# Patient Record
Sex: FEMALE | Race: ASIAN | NOT HISPANIC OR LATINO | URBAN - METROPOLITAN AREA
[De-identification: names, ages, dates, MRNs, and addresses within clinical notes are randomized per-mention and may not be internally consistent; named-entity substitution may affect disease eponyms.]

---

## 2018-05-01 ENCOUNTER — OUTPATIENT (OUTPATIENT)
Dept: OUTPATIENT SERVICES | Facility: HOSPITAL | Age: 28
LOS: 1 days | End: 2018-05-01
Payer: MEDICAID

## 2018-05-01 PROCEDURE — G9001: CPT

## 2018-05-07 ENCOUNTER — EMERGENCY (EMERGENCY)
Facility: HOSPITAL | Age: 28
LOS: 0 days | Discharge: LEFT BEFORE DISCHARGE | End: 2018-05-08
Attending: EMERGENCY MEDICINE
Payer: MEDICAID

## 2018-05-07 VITALS
SYSTOLIC BLOOD PRESSURE: 96 MMHG | TEMPERATURE: 100 F | OXYGEN SATURATION: 100 % | WEIGHT: 119.93 LBS | RESPIRATION RATE: 18 BRPM | HEIGHT: 64 IN | DIASTOLIC BLOOD PRESSURE: 54 MMHG | HEART RATE: 98 BPM

## 2018-05-07 DIAGNOSIS — A41.9 SEPSIS, UNSPECIFIED ORGANISM: ICD-10-CM

## 2018-05-07 DIAGNOSIS — R05 COUGH: ICD-10-CM

## 2018-05-07 DIAGNOSIS — M54.9 DORSALGIA, UNSPECIFIED: ICD-10-CM

## 2018-05-07 DIAGNOSIS — O99.89 OTHER SPECIFIED DISEASES AND CONDITIONS COMPLICATING PREGNANCY, CHILDBIRTH AND THE PUERPERIUM: ICD-10-CM

## 2018-05-07 DIAGNOSIS — R50.9 FEVER, UNSPECIFIED: ICD-10-CM

## 2018-05-07 LAB
APTT BLD: 32.2 SEC — SIGNIFICANT CHANGE UP (ref 27.5–37.4)
BASOPHILS # BLD AUTO: 0.03 K/UL — SIGNIFICANT CHANGE UP (ref 0–0.2)
BASOPHILS NFR BLD AUTO: 0.2 % — SIGNIFICANT CHANGE UP (ref 0–2)
EOSINOPHIL # BLD AUTO: 0.24 K/UL — SIGNIFICANT CHANGE UP (ref 0–0.5)
EOSINOPHIL NFR BLD AUTO: 1.8 % — SIGNIFICANT CHANGE UP (ref 0–6)
HCT VFR BLD CALC: 38.5 % — SIGNIFICANT CHANGE UP (ref 34.5–45)
HGB BLD-MCNC: 12.5 G/DL — SIGNIFICANT CHANGE UP (ref 11.5–15.5)
IMM GRANULOCYTES NFR BLD AUTO: 0.2 % — SIGNIFICANT CHANGE UP (ref 0–1.5)
INR BLD: 1.12 RATIO — SIGNIFICANT CHANGE UP (ref 0.88–1.16)
LYMPHOCYTES # BLD AUTO: 1.18 K/UL — SIGNIFICANT CHANGE UP (ref 1–3.3)
LYMPHOCYTES # BLD AUTO: 8.8 % — LOW (ref 13–44)
MCHC RBC-ENTMCNC: 24.7 PG — LOW (ref 27–34)
MCHC RBC-ENTMCNC: 32.5 GM/DL — SIGNIFICANT CHANGE UP (ref 32–36)
MCV RBC AUTO: 76.1 FL — LOW (ref 80–100)
MONOCYTES # BLD AUTO: 0.93 K/UL — HIGH (ref 0–0.9)
MONOCYTES NFR BLD AUTO: 6.9 % — SIGNIFICANT CHANGE UP (ref 2–14)
NEUTROPHILS # BLD AUTO: 11.02 K/UL — HIGH (ref 1.8–7.4)
NEUTROPHILS NFR BLD AUTO: 82.1 % — HIGH (ref 43–77)
NRBC # BLD: 0 /100 WBCS — SIGNIFICANT CHANGE UP (ref 0–0)
PLATELET # BLD AUTO: 270 K/UL — SIGNIFICANT CHANGE UP (ref 150–400)
PROTHROM AB SERPL-ACNC: 12.2 SEC — SIGNIFICANT CHANGE UP (ref 9.8–12.7)
RBC # BLD: 5.06 M/UL — SIGNIFICANT CHANGE UP (ref 3.8–5.2)
RBC # FLD: 14.3 % — SIGNIFICANT CHANGE UP (ref 10.3–14.5)
WBC # BLD: 13.43 K/UL — HIGH (ref 3.8–10.5)
WBC # FLD AUTO: 13.43 K/UL — HIGH (ref 3.8–10.5)

## 2018-05-07 PROCEDURE — 99284 EMERGENCY DEPT VISIT MOD MDM: CPT | Mod: 25

## 2018-05-08 VITALS — SYSTOLIC BLOOD PRESSURE: 105 MMHG | DIASTOLIC BLOOD PRESSURE: 56 MMHG | RESPIRATION RATE: 20 BRPM | HEART RATE: 95 BPM

## 2018-05-08 DIAGNOSIS — R69 ILLNESS, UNSPECIFIED: ICD-10-CM

## 2018-05-08 LAB
ALBUMIN SERPL ELPH-MCNC: 3.8 G/DL — SIGNIFICANT CHANGE UP (ref 3.3–5)
ALP SERPL-CCNC: 90 U/L — SIGNIFICANT CHANGE UP (ref 40–120)
ALT FLD-CCNC: 28 U/L — SIGNIFICANT CHANGE UP (ref 12–78)
ANION GAP SERPL CALC-SCNC: 12 MMOL/L — SIGNIFICANT CHANGE UP (ref 5–17)
APPEARANCE UR: CLEAR — SIGNIFICANT CHANGE UP
AST SERPL-CCNC: 17 U/L — SIGNIFICANT CHANGE UP (ref 15–37)
BILIRUB SERPL-MCNC: 0.3 MG/DL — SIGNIFICANT CHANGE UP (ref 0.2–1.2)
BILIRUB UR-MCNC: NEGATIVE — SIGNIFICANT CHANGE UP
BLD GP AB SCN SERPL QL: SIGNIFICANT CHANGE UP
BUN SERPL-MCNC: 10 MG/DL — SIGNIFICANT CHANGE UP (ref 7–23)
CALCIUM SERPL-MCNC: 10.3 MG/DL — HIGH (ref 8.5–10.1)
CHLORIDE SERPL-SCNC: 106 MMOL/L — SIGNIFICANT CHANGE UP (ref 96–108)
CO2 SERPL-SCNC: 22 MMOL/L — SIGNIFICANT CHANGE UP (ref 22–31)
COLOR SPEC: YELLOW — SIGNIFICANT CHANGE UP
CREAT SERPL-MCNC: 0.75 MG/DL — SIGNIFICANT CHANGE UP (ref 0.5–1.3)
DIFF PNL FLD: NEGATIVE — SIGNIFICANT CHANGE UP
GLUCOSE SERPL-MCNC: 78 MG/DL — SIGNIFICANT CHANGE UP (ref 70–99)
GLUCOSE UR QL: NEGATIVE MG/DL — SIGNIFICANT CHANGE UP
HCG SERPL-ACNC: SIGNIFICANT CHANGE UP MIU/ML
KETONES UR-MCNC: ABNORMAL
LACTATE SERPL-SCNC: 1.1 MMOL/L — SIGNIFICANT CHANGE UP (ref 0.7–2)
LEUKOCYTE ESTERASE UR-ACNC: NEGATIVE — SIGNIFICANT CHANGE UP
NITRITE UR-MCNC: NEGATIVE — SIGNIFICANT CHANGE UP
PH UR: 7 — SIGNIFICANT CHANGE UP (ref 5–8)
POTASSIUM SERPL-MCNC: 3.6 MMOL/L — SIGNIFICANT CHANGE UP (ref 3.5–5.3)
POTASSIUM SERPL-SCNC: 3.6 MMOL/L — SIGNIFICANT CHANGE UP (ref 3.5–5.3)
PROT SERPL-MCNC: 9 GM/DL — HIGH (ref 6–8.3)
PROT UR-MCNC: NEGATIVE MG/DL — SIGNIFICANT CHANGE UP
SODIUM SERPL-SCNC: 140 MMOL/L — SIGNIFICANT CHANGE UP (ref 135–145)
SP GR SPEC: 1.01 — SIGNIFICANT CHANGE UP (ref 1.01–1.02)
UROBILINOGEN FLD QL: NEGATIVE MG/DL — SIGNIFICANT CHANGE UP

## 2018-05-08 RX ORDER — ACETAMINOPHEN 500 MG
650 TABLET ORAL ONCE
Qty: 0 | Refills: 0 | Status: COMPLETED | OUTPATIENT
Start: 2018-05-08 | End: 2018-05-08

## 2018-05-08 RX ORDER — CEFTRIAXONE 500 MG/1
2 INJECTION, POWDER, FOR SOLUTION INTRAMUSCULAR; INTRAVENOUS ONCE
Qty: 0 | Refills: 0 | Status: COMPLETED | OUTPATIENT
Start: 2018-05-08 | End: 2018-05-08

## 2018-05-08 RX ORDER — SODIUM CHLORIDE 9 MG/ML
1000 INJECTION INTRAMUSCULAR; INTRAVENOUS; SUBCUTANEOUS ONCE
Qty: 0 | Refills: 0 | Status: COMPLETED | OUTPATIENT
Start: 2018-05-08 | End: 2018-05-08

## 2018-05-08 RX ORDER — METOCLOPRAMIDE HCL 10 MG
10 TABLET ORAL ONCE
Qty: 0 | Refills: 0 | Status: COMPLETED | OUTPATIENT
Start: 2018-05-08 | End: 2018-05-08

## 2018-05-08 RX ORDER — SODIUM CHLORIDE 9 MG/ML
2000 INJECTION INTRAMUSCULAR; INTRAVENOUS; SUBCUTANEOUS ONCE
Qty: 0 | Refills: 0 | Status: COMPLETED | OUTPATIENT
Start: 2018-05-08 | End: 2018-05-08

## 2018-05-08 RX ADMIN — Medication 650 MILLIGRAM(S): at 01:27

## 2018-05-08 RX ADMIN — SODIUM CHLORIDE 2000 MILLILITER(S): 9 INJECTION INTRAMUSCULAR; INTRAVENOUS; SUBCUTANEOUS at 01:26

## 2018-05-08 RX ADMIN — SODIUM CHLORIDE 2000 MILLILITER(S): 9 INJECTION INTRAMUSCULAR; INTRAVENOUS; SUBCUTANEOUS at 00:21

## 2018-05-08 RX ADMIN — Medication 10 MILLIGRAM(S): at 00:20

## 2018-05-08 RX ADMIN — CEFTRIAXONE 100 GRAM(S): 500 INJECTION, POWDER, FOR SOLUTION INTRAMUSCULAR; INTRAVENOUS at 01:30

## 2018-05-08 NOTE — ED PROVIDER NOTE - OBJECTIVE STATEMENT
Pertinent PMH/PSH/FHx/SHx and Review of Systems contained within:  27 yo 14 week gestation f presents in ED c/o fever, nasal congestion, cough, back pain and abdominal pain. No aggravating or relieving factors, No chills, No photophobia/eye pain/changes in vision, No ear pain/sore throat/dysphagia, No chest pain/palpitations, nowheeze/stridor, No N/V/D, no dysuria/frequency/discharge, No neck/back pain, no rash, no changes in neurological status/function.

## 2018-05-08 NOTE — ED PROVIDER NOTE - MEDICAL DECISION MAKING DETAILS
labs reviewed. Patient spoken through [facility] several times (ID [id number], [id number])regarding condition and risks and benefits of imaging and admission to hospital. Patient refusing all imaging including US and refusing to stay.  Last . The patient has decided to leave against medical advice.  The patient is AAOx3, not intoxicated, and displays normal decision making ability. We discussed all risks, benefits, and alternatives to the progression of treatment and the potential dangers of leaving including but not limited to permanent disability, injury, and death.  The patient was instructed that they are welcome to change their decision to leave against medical advice and return to the emergency department at any time and for any reason in order to allow us to render care.

## 2018-05-08 NOTE — ED ADULT NURSE REASSESSMENT NOTE - NS ED NURSE REASSESS COMMENT FT1
Cyracom used BY Md to communicate with pt,  Pt remains very selective with treatment, continues to cough intermittently then vomiting x1.  at bedside
Patient is alert and oriented to person, place and time of day. Discharged home, vss, ambulatory .Discharge  instructions given to pt/

## 2018-05-08 NOTE — ED ADULT NURSE NOTE - OBJECTIVE STATEMENT
Patient complaining of abdominal pain, nausea and vomiting for the last 2 days Patient complaining of abdominal pain,fever,  nausea and vomiting for the last 2 days, pt is 12 weeks pregnant

## 2018-05-09 LAB
CULTURE RESULTS: NO GROWTH — SIGNIFICANT CHANGE UP
SPECIMEN SOURCE: SIGNIFICANT CHANGE UP

## 2018-05-13 LAB
CULTURE RESULTS: SIGNIFICANT CHANGE UP
CULTURE RESULTS: SIGNIFICANT CHANGE UP
SPECIMEN SOURCE: SIGNIFICANT CHANGE UP
SPECIMEN SOURCE: SIGNIFICANT CHANGE UP

## 2018-10-19 ENCOUNTER — EMERGENCY (EMERGENCY)
Facility: HOSPITAL | Age: 28
LOS: 1 days | Discharge: STILL ADMITTED OR ADMINISTRATIVE | End: 2018-10-19
Admitting: EMERGENCY MEDICINE

## 2018-10-19 ENCOUNTER — INPATIENT (INPATIENT)
Facility: HOSPITAL | Age: 28
LOS: 2 days | Discharge: HOME | End: 2018-10-22
Attending: SPECIALIST | Admitting: SPECIALIST

## 2018-10-19 VITALS
SYSTOLIC BLOOD PRESSURE: 130 MMHG | HEART RATE: 82 BPM | DIASTOLIC BLOOD PRESSURE: 92 MMHG | RESPIRATION RATE: 24 BRPM | OXYGEN SATURATION: 100 %

## 2018-10-19 VITALS — HEART RATE: 83 BPM | DIASTOLIC BLOOD PRESSURE: 60 MMHG | SYSTOLIC BLOOD PRESSURE: 135 MMHG | TEMPERATURE: 98 F

## 2018-10-19 DIAGNOSIS — Z3A.00 WEEKS OF GESTATION OF PREGNANCY NOT SPECIFIED: ICD-10-CM

## 2018-10-19 DIAGNOSIS — O26.899 OTHER SPECIFIED PREGNANCY RELATED CONDITIONS, UNSPECIFIED TRIMESTER: ICD-10-CM

## 2018-10-19 LAB
BASOPHILS # BLD AUTO: 0.03 K/UL — SIGNIFICANT CHANGE UP (ref 0–0.2)
BASOPHILS NFR BLD AUTO: 0.2 % — SIGNIFICANT CHANGE UP (ref 0–2)
BLD GP AB SCN SERPL QL: NEGATIVE — SIGNIFICANT CHANGE UP
EOSINOPHIL # BLD AUTO: 0.1 K/UL — SIGNIFICANT CHANGE UP (ref 0–0.5)
EOSINOPHIL NFR BLD AUTO: 0.7 % — SIGNIFICANT CHANGE UP (ref 0–6)
HBV SURFACE AG SER-ACNC: NEGATIVE — SIGNIFICANT CHANGE UP
HCT VFR BLD CALC: 36.5 % — SIGNIFICANT CHANGE UP (ref 34.5–45)
HGB BLD-MCNC: 11.7 G/DL — SIGNIFICANT CHANGE UP (ref 11.5–15.5)
IMM GRANULOCYTES # BLD AUTO: 0.13 # — SIGNIFICANT CHANGE UP
IMM GRANULOCYTES NFR BLD AUTO: 0.9 % — SIGNIFICANT CHANGE UP (ref 0–1.5)
LYMPHOCYTES # BLD AUTO: 1.48 K/UL — SIGNIFICANT CHANGE UP (ref 1–3.3)
LYMPHOCYTES # BLD AUTO: 10.5 % — LOW (ref 13–44)
MCHC RBC-ENTMCNC: 24.9 PG — LOW (ref 27–34)
MCHC RBC-ENTMCNC: 32.1 % — SIGNIFICANT CHANGE UP (ref 32–36)
MCV RBC AUTO: 77.8 FL — LOW (ref 80–100)
MONOCYTES # BLD AUTO: 1 K/UL — HIGH (ref 0–0.9)
MONOCYTES NFR BLD AUTO: 7.1 % — SIGNIFICANT CHANGE UP (ref 2–14)
NEUTROPHILS # BLD AUTO: 11.41 K/UL — HIGH (ref 1.8–7.4)
NEUTROPHILS NFR BLD AUTO: 80.6 % — HIGH (ref 43–77)
NRBC # FLD: 0 — SIGNIFICANT CHANGE UP
PLATELET # BLD AUTO: 218 K/UL — SIGNIFICANT CHANGE UP (ref 150–400)
PMV BLD: 11.4 FL — SIGNIFICANT CHANGE UP (ref 7–13)
RBC # BLD: 4.69 M/UL — SIGNIFICANT CHANGE UP (ref 3.8–5.2)
RBC # FLD: 14.4 % — SIGNIFICANT CHANGE UP (ref 10.3–14.5)
RH IG SCN BLD-IMP: POSITIVE — SIGNIFICANT CHANGE UP
RH IG SCN BLD-IMP: POSITIVE — SIGNIFICANT CHANGE UP
WBC # BLD: 14.15 K/UL — HIGH (ref 3.8–10.5)
WBC # FLD AUTO: 14.15 K/UL — HIGH (ref 3.8–10.5)

## 2018-10-19 RX ORDER — IBUPROFEN 200 MG
600 TABLET ORAL EVERY 6 HOURS
Qty: 0 | Refills: 0 | Status: COMPLETED | OUTPATIENT
Start: 2018-10-19 | End: 2019-09-17

## 2018-10-19 RX ORDER — SODIUM CHLORIDE 9 MG/ML
3 INJECTION INTRAMUSCULAR; INTRAVENOUS; SUBCUTANEOUS EVERY 8 HOURS
Qty: 0 | Refills: 0 | Status: DISCONTINUED | OUTPATIENT
Start: 2018-10-19 | End: 2018-10-19

## 2018-10-19 RX ORDER — IBUPROFEN 200 MG
600 TABLET ORAL EVERY 6 HOURS
Qty: 0 | Refills: 0 | Status: DISCONTINUED | OUTPATIENT
Start: 2018-10-19 | End: 2018-10-22

## 2018-10-19 RX ORDER — SODIUM CHLORIDE 9 MG/ML
1000 INJECTION, SOLUTION INTRAVENOUS
Qty: 0 | Refills: 0 | Status: DISCONTINUED | OUTPATIENT
Start: 2018-10-19 | End: 2018-10-19

## 2018-10-19 RX ORDER — HYDROCORTISONE 1 %
1 OINTMENT (GRAM) TOPICAL EVERY 4 HOURS
Qty: 0 | Refills: 0 | Status: DISCONTINUED | OUTPATIENT
Start: 2018-10-19 | End: 2018-10-22

## 2018-10-19 RX ORDER — ACETAMINOPHEN 500 MG
975 TABLET ORAL EVERY 6 HOURS
Qty: 0 | Refills: 0 | Status: COMPLETED | OUTPATIENT
Start: 2018-10-19 | End: 2019-09-17

## 2018-10-19 RX ORDER — SODIUM CHLORIDE 9 MG/ML
3 INJECTION INTRAMUSCULAR; INTRAVENOUS; SUBCUTANEOUS EVERY 8 HOURS
Qty: 0 | Refills: 0 | Status: DISCONTINUED | OUTPATIENT
Start: 2018-10-19 | End: 2018-10-21

## 2018-10-19 RX ORDER — OXYTOCIN 10 UNIT/ML
41.67 VIAL (ML) INJECTION
Qty: 20 | Refills: 0 | Status: DISCONTINUED | OUTPATIENT
Start: 2018-10-19 | End: 2018-10-19

## 2018-10-19 RX ORDER — DIPHENHYDRAMINE HCL 50 MG
25 CAPSULE ORAL EVERY 6 HOURS
Qty: 0 | Refills: 0 | Status: DISCONTINUED | OUTPATIENT
Start: 2018-10-19 | End: 2018-10-22

## 2018-10-19 RX ORDER — GLYCERIN ADULT
1 SUPPOSITORY, RECTAL RECTAL AT BEDTIME
Qty: 0 | Refills: 0 | Status: DISCONTINUED | OUTPATIENT
Start: 2018-10-19 | End: 2018-10-22

## 2018-10-19 RX ORDER — LANOLIN
1 OINTMENT (GRAM) TOPICAL EVERY 6 HOURS
Qty: 0 | Refills: 0 | Status: DISCONTINUED | OUTPATIENT
Start: 2018-10-19 | End: 2018-10-22

## 2018-10-19 RX ORDER — SIMETHICONE 80 MG/1
80 TABLET, CHEWABLE ORAL EVERY 6 HOURS
Qty: 0 | Refills: 0 | Status: DISCONTINUED | OUTPATIENT
Start: 2018-10-19 | End: 2018-10-22

## 2018-10-19 RX ORDER — MAGNESIUM HYDROXIDE 400 MG/1
30 TABLET, CHEWABLE ORAL
Qty: 0 | Refills: 0 | Status: DISCONTINUED | OUTPATIENT
Start: 2018-10-19 | End: 2018-10-22

## 2018-10-19 RX ORDER — HYDROCORTISONE 1 %
1 OINTMENT (GRAM) TOPICAL EVERY 4 HOURS
Qty: 0 | Refills: 0 | Status: DISCONTINUED | OUTPATIENT
Start: 2018-10-19 | End: 2018-10-19

## 2018-10-19 RX ORDER — PRAMOXINE HYDROCHLORIDE 150 MG/15G
1 AEROSOL, FOAM RECTAL EVERY 4 HOURS
Qty: 0 | Refills: 0 | Status: DISCONTINUED | OUTPATIENT
Start: 2018-10-19 | End: 2018-10-22

## 2018-10-19 RX ORDER — INFLUENZA VIRUS VACCINE 15; 15; 15; 15 UG/.5ML; UG/.5ML; UG/.5ML; UG/.5ML
0.5 SUSPENSION INTRAMUSCULAR ONCE
Qty: 0 | Refills: 0 | Status: COMPLETED | OUTPATIENT
Start: 2018-10-19 | End: 2018-10-20

## 2018-10-19 RX ORDER — DIBUCAINE 1 %
1 OINTMENT (GRAM) RECTAL EVERY 4 HOURS
Qty: 0 | Refills: 0 | Status: DISCONTINUED | OUTPATIENT
Start: 2018-10-19 | End: 2018-10-19

## 2018-10-19 RX ORDER — AER TRAVELER 0.5 G/1
1 SOLUTION RECTAL; TOPICAL EVERY 4 HOURS
Qty: 0 | Refills: 0 | Status: DISCONTINUED | OUTPATIENT
Start: 2018-10-19 | End: 2018-10-19

## 2018-10-19 RX ORDER — OXYCODONE HYDROCHLORIDE 5 MG/1
5 TABLET ORAL
Qty: 0 | Refills: 0 | Status: DISCONTINUED | OUTPATIENT
Start: 2018-10-19 | End: 2018-10-22

## 2018-10-19 RX ORDER — AER TRAVELER 0.5 G/1
1 SOLUTION RECTAL; TOPICAL EVERY 4 HOURS
Qty: 0 | Refills: 0 | Status: DISCONTINUED | OUTPATIENT
Start: 2018-10-19 | End: 2018-10-22

## 2018-10-19 RX ORDER — OXYTOCIN 10 UNIT/ML
41.67 VIAL (ML) INJECTION
Qty: 20 | Refills: 0 | Status: DISCONTINUED | OUTPATIENT
Start: 2018-10-19 | End: 2018-10-22

## 2018-10-19 RX ORDER — PRAMOXINE HYDROCHLORIDE 150 MG/15G
1 AEROSOL, FOAM RECTAL EVERY 4 HOURS
Qty: 0 | Refills: 0 | Status: DISCONTINUED | OUTPATIENT
Start: 2018-10-19 | End: 2018-10-19

## 2018-10-19 RX ORDER — ACETAMINOPHEN 500 MG
975 TABLET ORAL EVERY 6 HOURS
Qty: 0 | Refills: 0 | Status: DISCONTINUED | OUTPATIENT
Start: 2018-10-19 | End: 2018-10-22

## 2018-10-19 RX ORDER — OXYCODONE HYDROCHLORIDE 5 MG/1
5 TABLET ORAL EVERY 4 HOURS
Qty: 0 | Refills: 0 | Status: DISCONTINUED | OUTPATIENT
Start: 2018-10-19 | End: 2018-10-22

## 2018-10-19 RX ORDER — OXYTOCIN 10 UNIT/ML
333.33 VIAL (ML) INJECTION
Qty: 20 | Refills: 0 | Status: DISCONTINUED | OUTPATIENT
Start: 2018-10-19 | End: 2018-10-19

## 2018-10-19 RX ORDER — TETANUS TOXOID, REDUCED DIPHTHERIA TOXOID AND ACELLULAR PERTUSSIS VACCINE, ADSORBED 5; 2.5; 8; 8; 2.5 [IU]/.5ML; [IU]/.5ML; UG/.5ML; UG/.5ML; UG/.5ML
0.5 SUSPENSION INTRAMUSCULAR ONCE
Qty: 0 | Refills: 0 | Status: DISCONTINUED | OUTPATIENT
Start: 2018-10-19 | End: 2018-10-22

## 2018-10-19 RX ORDER — KETOROLAC TROMETHAMINE 30 MG/ML
30 SYRINGE (ML) INJECTION ONCE
Qty: 0 | Refills: 0 | Status: DISCONTINUED | OUTPATIENT
Start: 2018-10-19 | End: 2018-10-19

## 2018-10-19 RX ORDER — DOCUSATE SODIUM 100 MG
100 CAPSULE ORAL
Qty: 0 | Refills: 0 | Status: DISCONTINUED | OUTPATIENT
Start: 2018-10-19 | End: 2018-10-22

## 2018-10-19 RX ORDER — DIBUCAINE 1 %
1 OINTMENT (GRAM) RECTAL EVERY 4 HOURS
Qty: 0 | Refills: 0 | Status: DISCONTINUED | OUTPATIENT
Start: 2018-10-19 | End: 2018-10-22

## 2018-10-19 RX ADMIN — Medication 30 MILLIGRAM(S): at 14:02

## 2018-10-19 RX ADMIN — Medication 600 MILLIGRAM(S): at 21:50

## 2018-10-19 RX ADMIN — Medication 975 MILLIGRAM(S): at 21:50

## 2018-10-19 RX ADMIN — OXYCODONE HYDROCHLORIDE 5 MILLIGRAM(S): 5 TABLET ORAL at 14:07

## 2018-10-19 RX ADMIN — Medication 975 MILLIGRAM(S): at 22:30

## 2018-10-19 RX ADMIN — Medication 600 MILLIGRAM(S): at 22:30

## 2018-10-19 RX ADMIN — Medication 30 MILLIGRAM(S): at 13:25

## 2018-10-19 RX ADMIN — Medication 125 MILLIUNIT(S)/MIN: at 13:01

## 2018-10-19 RX ADMIN — Medication 1000 MILLIUNIT(S)/MIN: at 12:35

## 2018-10-19 NOTE — PATIENT PROFILE OB - ALERT: PERTINENT HISTORY
1st Trimester Sonogram/20 Week Level II Sonogram/BioPhysical Profile(s)/Non Invasive Prenatal Screen (NIPS)/Follow up Sonogram for Growth

## 2018-10-19 NOTE — PATIENT PROFILE OB - PRO ANTIBODY SCREEN
History of Present Illness  [facility] Clinical Progress Note [facility]:   Specialized Services Documentation - Therapist must complete separate progress note for each specific clinical activity in which the client participated during the day  (875) Group Psychotherapy: (9:30-10:30) Neean participated in psychotherapy group focused on self-esteem, communication and mental health stigma  Neena identified her memory and concentration as a stressor  Neena discussed her pattern of needing validation from other people constantly and stated that she wants to work on being able to provide herself with the validation she needs  She stated that the last time she was in program, she feels she did not work hard enough outside of the program day, but this time she wants to take it more seriously and work on herself outside of the program because she wants to feel happier with herself  Peers provided good support and suggestions for ways to focus on changing the way she thinks and affirms herself  Moderate progress toward goals noted today  Continue psychotherapy group to encourage Neena to explore stressors and coping, as well as to relate to peers on similar issues  Treatment Plan Problem(s): 1 1, 1 2  Carolyn Walden MSW, LSW     (284) Group Psychotherapy: [id number] Jenny Kaufman participated in wellness group focused on healthy ways to manage daily stress and to prevent decompensation  Neena shared that some types of stress are actually "good stress" as it prepares you to act and in preparing such as making a list of goals for the next day anticipating a stressful day that it will decrease stress and help her sleep better  Jenny Kaufman made moderate beginning progress toward goal  Continue group to provide education and practice on healthy coping mechanisms to manage daily stress  Treatment Plan Problem(s): 1 1,1 2  Roland Waters RN       (902) Education Therapy Goals set - take daughter shopping     Treatment Plan Problem(s): 1 1  Education Therapy Time - 0900 - 0930 Previous goal was met  Readiness to Learning:  She is receptive to learning  There are  no barriers to learning  Learning Assessment Time - 1330 - 1400   Education completed on  illness, medication and wellness tools  The teaching method was  verbal and written  Shared area of learning: Yes  FAYE Castro     (166) Allied Therapy [id number] Neena actively shared in [facility] group focused on self-care  Group engaged in task exploring what individuals do for self-care and barriers to accomplishing this  She shared that self-care for her has struggled due to time and her belief that [facility] else comes firstâ  She identified a way to commit to her self-care tonight is to âeat dinnerâ  Some beginning effort toward goal observed  Continue AT to explore self-awareness and personal role in self-care  Treatment Plan Problem(s): 1 1  FAYE Castro       Case Management Note:   2564-7653 Met with Neena  Reviewed treatment plan  She was tearful but in control reporting that she "texted" Fabien Singh last night in response to him texting her Tuesday night that she have a good day  Encouraged her to think about her ability to text other positive people in her life (like her children)  She has plans tonight to school shop with her daughter which she knows she needs to budget  Neena reported her Gabapentin dose is correct in the system (300mg TID)  TREATMENT SESSION NUMBER: 2   Current suicide risk is low  Medications not changed/added/denied  FAYE Castro      Active Problems    1  ADHD, predominantly inattentive type (314 00) (F90 0)   2  Allergic rhinitis (477 9) (J30 9)   3  Anxiety and depression (300 00,311) (F41 9,F32 9)   4  Benign essential hypertension (401 1) (I10)   5  BMI 31 0-31 9,adult (V85 31) (Z68 31)   6  Cervicalgia (723 1) (M54 2)   7  Constipation (564 00) (K59 00)   8   Dysfunction of eustachian tube, unspecified laterality (381 81) (H69 80)   9  Encounter for screening mammogram for malignant neoplasm of breast (V76 12)   (Z12 31)   10  FORTINO (generalized anxiety disorder) (300 02) (F41 1)   11  Genital herpes simplex (054 10) (A60 00)   12  Headache (784 0) (R51)   13  Hip pain, unspecified laterality   14  Hypokalemia (276 8) (E87 6)   15  Lumbago (724 2) (M54 5)   16  Lyme disease (088 81) (A69 20)   17  Motion sickness (994 6) (T75 3XXA)   18  Obesity, Class I, BMI 30-34 9 (278 00) (E66 9)   19  Other muscle spasm (728 85) (M62 838)   20  Severe episode of recurrent major depressive disorder, without psychotic features    (296 33) (F33 2)   21  Tachycardia (785 0) (R00 0)   22  Thyroid disorder (246 9) (E07 9)   23  Well adult on routine health check (V70 0) (Z00 00)    Past Medical History    1  History of Acute bronchitis (466 0) (J20 9)   2  History of Acute pharyngitis (462) (J02 9)   3  History of Acute sinusitis (461 9) (J01 90)   4  History of Acute sinusitis (461 9) (J01 90)   5  History of Acute tonsillitis (463) (J03 90)   6  History of Acute upper respiratory infection (465 9) (J06 9)   7  History of Acute upper respiratory infection (465 9) (J06 9)   8  History of Acute UTI (599 0) (N39 0)   9  History of Cervicalgia (723 1) (M54 2)   10  Denied: History of Head trauma   11  History of acute pharyngitis (V12 69) (Z87 09)   12  History of migraine (V12 49) (Z86 69)   13  History of Nonallopathic lesion (739 9) (M99 9)   14  History of Palpitations (785 1) (R00 2)   15  History of Sebaceous Gland Disorder (706 9)   16  Denied: History of Seizure   17  History of Shoulder joint pain, unspecified laterality   18  Urinary tract infection (599 0) (N39 0)    Allergies    1  Percocet TABS    Current Meds   1  Albuterol 90 MCG/ACT AERS; INHALE 1 TO 2 PUFFS EVERY 4 TO 6 HOURS AS   NEEDED; Therapy: (Recorded:38Cwe4620) to Recorded   2   BuPROPion HCl ER (XL) 300 MG Oral Tablet Extended Release 24 Hour; TAKE 1   TABLET Daily dhaval tablets; Therapy: 99LHJ8514 to (Evaluate:23Jun2016)  Requested for: 39DAK8004; Last   Rx:69Myf8043 Ordered   3  CloNIDine HCl - 0 1 MG Oral Tablet; Take one tablet PO  AT 6:00 PM and 1 tablet PO AT   9:00 pm;   Therapy: (Recorded:01Ume2406) to Recorded   4  Griselda 0 35 MG Oral Tablet; TAKE 1 TABLET DAILY; Therapy: 15APB0568 to Recorded   5  Flonase 50 MCG/ACT SUSP; USE 1 SPRAY IN EACH NOSTRIL ONCE DAILY; Therapy: (Recorded:27Xsg2104) to Recorded   6  Gabapentin 300 MG Oral Capsule; TAKE 3 CAPSULE Bedtime; Therapy: (Recorded:00Xgf8974) to Recorded   7  HydrOXYzine HCl - 25 MG Oral Tablet; TAKE 1 TO 2 TABLETS AT BEDTIME; Last   Rx:24May2016 Ordered   8  Metoprolol Succinate ER 50 MG Oral Tablet Extended Release 24 Hour; TAKE 1 TABLET   DAILY; Therapy: 54RMB6902 to (Evaluate:16Oct2016)  Requested for: 41BPD4303; Last   Rx:19Apr2016 Ordered   9  Multivitamins TABS; 1 Every Day; Therapy: 01MVR2820 to  Requested for: 93YZI7255 Recorded   10  Pristiq 100 MG Oral Tablet Extended Release 24 Hour; Take 1 tablet daily; Therapy: 49ATV4884 to  Requested for: 50LHU6082 Recorded   11  Vyvanse 50 MG Oral Capsule; 1 capsule at 2:00pm;    Therapy: 62UHT2322 to  Requested for: 95PPB7180 Recorded   12  Vyvanse 60 MG Oral Capsule; take 1 capsule daily; Therapy: (Recorded:24May2016) to Recorded   13  Zaleplon 10 MG Oral Capsule; TAKE 1 TO 2 CAPSULES AT BEDTIME AS NEEDED; Last    JY:93XRR4935 Ordered    Family Psych History  Son    1  Family history of attention deficit hyperactivity disorder (ADHD) (V17 0) (Z81 8)  Sister    2  Family history of Bipolar 1 disorder  Family History    3  Family history of Asbestosis   4  Family history of Cancer   5  Family history of Diabetes Mellitus (V18 0)   6  Family history of Emphysema   7  Family history of Glaucoma   8  Family history of Heart Disease (V17 49)   9  Family history of Hypertension (V17 49)   10   Family history of Mixed Hyperlipoproteinemia    Social History    · College graduate   ·    · Employed   · Never A Smoker   · No history of alcohol use    Future Appointments    Date/Time Provider Specialty Site   09/02/2016 10:00 AM GEORGETTE Wang   [facility] PARTIAL HOSPITALIZATION   09/20/2016 11:00 AM Paulette Hankins [facility]     Signatures   Electronically signed by : JONATHAN You; Sep  1 2016 12:53PM EST                       (Author)    Electronically signed by : FAYE Jones; Sep  1 2016  2:38PM EST                       (Author)    Electronically signed by : Luis Carlos Jaime RN; Sep  1 2016  3:44PM EST                       (Author) positive

## 2018-10-20 LAB — T PALLIDUM AB TITR SER: NEGATIVE — SIGNIFICANT CHANGE UP

## 2018-10-20 RX ORDER — IBUPROFEN 200 MG
1 TABLET ORAL
Qty: 0 | Refills: 0 | DISCHARGE
Start: 2018-10-20

## 2018-10-20 RX ORDER — IBUPROFEN 200 MG
1 TABLET ORAL
Qty: 0 | Refills: 0 | COMMUNITY
Start: 2018-10-20

## 2018-10-20 RX ADMIN — Medication 600 MILLIGRAM(S): at 17:52

## 2018-10-20 RX ADMIN — SODIUM CHLORIDE 3 MILLILITER(S): 9 INJECTION INTRAMUSCULAR; INTRAVENOUS; SUBCUTANEOUS at 14:57

## 2018-10-20 RX ADMIN — Medication 975 MILLIGRAM(S): at 18:52

## 2018-10-20 RX ADMIN — Medication 975 MILLIGRAM(S): at 17:52

## 2018-10-20 RX ADMIN — Medication 975 MILLIGRAM(S): at 03:55

## 2018-10-20 RX ADMIN — INFLUENZA VIRUS VACCINE 0.5 MILLILITER(S): 15; 15; 15; 15 SUSPENSION INTRAMUSCULAR at 22:59

## 2018-10-20 RX ADMIN — OXYCODONE HYDROCHLORIDE 5 MILLIGRAM(S): 5 TABLET ORAL at 02:14

## 2018-10-20 RX ADMIN — Medication 100 MILLIGRAM(S): at 02:14

## 2018-10-20 RX ADMIN — Medication 600 MILLIGRAM(S): at 12:07

## 2018-10-20 RX ADMIN — Medication 975 MILLIGRAM(S): at 04:30

## 2018-10-20 RX ADMIN — Medication 600 MILLIGRAM(S): at 03:55

## 2018-10-20 RX ADMIN — Medication 600 MILLIGRAM(S): at 04:30

## 2018-10-20 RX ADMIN — Medication 1 TABLET(S): at 12:07

## 2018-10-20 RX ADMIN — Medication 975 MILLIGRAM(S): at 13:00

## 2018-10-20 RX ADMIN — Medication 975 MILLIGRAM(S): at 12:08

## 2018-10-20 RX ADMIN — Medication 600 MILLIGRAM(S): at 13:00

## 2018-10-20 RX ADMIN — Medication 600 MILLIGRAM(S): at 18:50

## 2018-10-20 RX ADMIN — OXYCODONE HYDROCHLORIDE 5 MILLIGRAM(S): 5 TABLET ORAL at 03:00

## 2018-10-20 NOTE — DISCHARGE NOTE OB - AVOID SEXUAL ACTIVITY UNTIL YOUR POSTPARTUM VISIT
Patient passed SBT and extubated in am doing well  Passed swallow and started on diet  Complains of pain off fentanyl drip will add hydrocodone? Allergy noted but patient received diludid when she came without any problem      CIRA Quiñonez [address].  Danielle Leon [address] [address]  Phone [phone]   Fax [phone]  Pulmonary Critical Care & Sleep Medicine Statement Selected

## 2018-10-20 NOTE — DISCHARGE NOTE OB - CARE PLAN
Principal Discharge DX:	Vaginal delivery  Goal:	routine postpartum care  Assessment and plan of treatment:	follow up in 4 weeks/ regular diet  & activity as tolerate

## 2018-10-20 NOTE — DISCHARGE NOTE OB - MATERIALS PROVIDED
[facility] Screening Program/Breastfeeding Log/[facility] Hearing Screen Program/Guide to Postpartum Care [facility]  Screening Program/[place]  Immunization Record/Breastfeeding Log/Shaken Baby Prevention Handout/Birth Certificate Instructions/Breastfeeding Guide and Packet/MMR Vaccination (VIS Pub Date: 2012)/[facility] Hearing Screen Program/Discharge Medication Information for Patients and Families Pocket Guide/Tdap Vaccination (VIS Pub Date: 2012)/Breastfeeding Mother’s Support Group Information/Guide to Postpartum Care

## 2018-10-20 NOTE — DISCHARGE NOTE OB - CARE PROVIDER_API CALL
Romel Vela), Obstetrics and Gynecology  [address] [place] [address]  Phone: [phone]  Fax: [phone]

## 2018-10-20 NOTE — DISCHARGE NOTE OB - MEDICATION SUMMARY - MEDICATIONS TO TAKE
I will START or STAY ON the medications listed below when I get home from the hospital:    ibuprofen 600 mg oral tablet  -- 1 tab(s) by mouth every 6 hours  -- Indication: For for pain I will START or STAY ON the medications listed below when I get home from the hospital:    ibuprofen 600 mg oral tablet  -- 1 tab(s) by mouth every 6 hours, As Needed  -- Indication: For moderate pain

## 2018-10-20 NOTE — DISCHARGE NOTE OB - PATIENT PORTAL LINK FT
You can access the [facility] Patient Portal, offered by [facility], by registering with the following website: [email or web address]

## 2018-10-21 RX ADMIN — Medication 975 MILLIGRAM(S): at 01:20

## 2018-10-21 RX ADMIN — Medication 975 MILLIGRAM(S): at 14:30

## 2018-10-21 RX ADMIN — Medication 600 MILLIGRAM(S): at 23:42

## 2018-10-21 RX ADMIN — Medication 975 MILLIGRAM(S): at 23:42

## 2018-10-21 RX ADMIN — Medication 600 MILLIGRAM(S): at 13:58

## 2018-10-21 RX ADMIN — Medication 975 MILLIGRAM(S): at 06:54

## 2018-10-21 RX ADMIN — Medication 600 MILLIGRAM(S): at 00:40

## 2018-10-21 RX ADMIN — Medication 975 MILLIGRAM(S): at 13:58

## 2018-10-21 RX ADMIN — Medication 600 MILLIGRAM(S): at 01:20

## 2018-10-21 RX ADMIN — Medication 600 MILLIGRAM(S): at 07:24

## 2018-10-21 RX ADMIN — Medication 1 TABLET(S): at 13:58

## 2018-10-21 RX ADMIN — Medication 975 MILLIGRAM(S): at 07:24

## 2018-10-21 RX ADMIN — Medication 600 MILLIGRAM(S): at 06:54

## 2018-10-21 RX ADMIN — Medication 600 MILLIGRAM(S): at 14:30

## 2018-10-21 RX ADMIN — Medication 975 MILLIGRAM(S): at 00:40

## 2018-10-21 NOTE — PROVIDER CONTACT NOTE (OTHER) - ACTION/TREATMENT ORDERED:
Since Dr Vela not in the hospital she stated she would ask an OB resident to cancel pt d/c order for today. RYLAN Becker RN notified of social situation.

## 2018-10-21 NOTE — PROVIDER CONTACT NOTE (OTHER) - SITUATION
Notified Dr Vela regarding social work/ ACS evaluation prior to 's d/c home. ACS unable to clear infant today, and will continue the investigation tomorrow 10/22/18.

## 2018-10-22 VITALS
SYSTOLIC BLOOD PRESSURE: 108 MMHG | OXYGEN SATURATION: 100 % | RESPIRATION RATE: 18 BRPM | HEART RATE: 80 BPM | TEMPERATURE: 98 F | DIASTOLIC BLOOD PRESSURE: 69 MMHG

## 2018-10-22 RX ADMIN — Medication 975 MILLIGRAM(S): at 06:47

## 2018-10-22 RX ADMIN — Medication 975 MILLIGRAM(S): at 00:15

## 2018-10-22 RX ADMIN — Medication 975 MILLIGRAM(S): at 17:05

## 2018-10-22 RX ADMIN — Medication 600 MILLIGRAM(S): at 06:30

## 2018-10-22 RX ADMIN — Medication 975 MILLIGRAM(S): at 06:30

## 2018-10-22 RX ADMIN — Medication 975 MILLIGRAM(S): at 12:13

## 2018-10-22 RX ADMIN — Medication 600 MILLIGRAM(S): at 00:15

## 2018-10-22 RX ADMIN — Medication 1 TABLET(S): at 12:13

## 2018-10-22 RX ADMIN — Medication 600 MILLIGRAM(S): at 17:05

## 2018-10-22 RX ADMIN — Medication 600 MILLIGRAM(S): at 12:13

## 2018-10-22 RX ADMIN — Medication 600 MILLIGRAM(S): at 06:47

## 2018-10-22 RX ADMIN — Medication 25 MILLIGRAM(S): at 01:00

## 2019-08-18 ENCOUNTER — INPATIENT (INPATIENT)
Facility: HOSPITAL | Age: 29
LOS: 1 days | Discharge: HOME | End: 2019-08-20
Attending: SPECIALIST | Admitting: SPECIALIST

## 2019-08-18 ENCOUNTER — EMERGENCY (EMERGENCY)
Facility: HOSPITAL | Age: 29
LOS: 1 days | Discharge: STILL ADMITTED OR ADMINISTRATIVE | End: 2019-08-18
Admitting: EMERGENCY MEDICINE

## 2019-08-18 VITALS
DIASTOLIC BLOOD PRESSURE: 71 MMHG | HEART RATE: 99 BPM | OXYGEN SATURATION: 99 % | SYSTOLIC BLOOD PRESSURE: 119 MMHG | HEIGHT: 63 IN | TEMPERATURE: 98 F | RESPIRATION RATE: 20 BRPM | WEIGHT: 169.76 LBS

## 2019-08-18 DIAGNOSIS — O26.899 OTHER SPECIFIED PREGNANCY RELATED CONDITIONS, UNSPECIFIED TRIMESTER: ICD-10-CM

## 2019-08-18 DIAGNOSIS — Z3A.00 WEEKS OF GESTATION OF PREGNANCY NOT SPECIFIED: ICD-10-CM

## 2019-08-18 LAB
BASOPHILS # BLD AUTO: 0.04 K/UL — SIGNIFICANT CHANGE UP (ref 0–0.2)
BASOPHILS NFR BLD AUTO: 0.4 % — SIGNIFICANT CHANGE UP (ref 0–2)
BLD GP AB SCN SERPL QL: NEGATIVE — SIGNIFICANT CHANGE UP
EOSINOPHIL # BLD AUTO: 0.15 K/UL — SIGNIFICANT CHANGE UP (ref 0–0.5)
EOSINOPHIL NFR BLD AUTO: 1.4 % — SIGNIFICANT CHANGE UP (ref 0–6)
HCT VFR BLD CALC: 39.5 % — SIGNIFICANT CHANGE UP (ref 34.5–45)
HGB BLD-MCNC: 12.6 G/DL — SIGNIFICANT CHANGE UP (ref 11.5–15.5)
IMM GRANULOCYTES NFR BLD AUTO: 0.6 % — SIGNIFICANT CHANGE UP (ref 0–1.5)
LYMPHOCYTES # BLD AUTO: 2.59 K/UL — SIGNIFICANT CHANGE UP (ref 1–3.3)
LYMPHOCYTES # BLD AUTO: 24.7 % — SIGNIFICANT CHANGE UP (ref 13–44)
MCHC RBC-ENTMCNC: 24.2 PG — LOW (ref 27–34)
MCHC RBC-ENTMCNC: 31.9 % — LOW (ref 32–36)
MCV RBC AUTO: 76 FL — LOW (ref 80–100)
MONOCYTES # BLD AUTO: 1.09 K/UL — HIGH (ref 0–0.9)
MONOCYTES NFR BLD AUTO: 10.4 % — SIGNIFICANT CHANGE UP (ref 2–14)
NEUTROPHILS # BLD AUTO: 6.57 K/UL — SIGNIFICANT CHANGE UP (ref 1.8–7.4)
NEUTROPHILS NFR BLD AUTO: 62.5 % — SIGNIFICANT CHANGE UP (ref 43–77)
NRBC # FLD: 0 K/UL — SIGNIFICANT CHANGE UP (ref 0–0)
PLATELET # BLD AUTO: 263 K/UL — SIGNIFICANT CHANGE UP (ref 150–400)
PMV BLD: 11.5 FL — SIGNIFICANT CHANGE UP (ref 7–13)
RBC # BLD: 5.2 M/UL — SIGNIFICANT CHANGE UP (ref 3.8–5.2)
RBC # FLD: 14.3 % — SIGNIFICANT CHANGE UP (ref 10.3–14.5)
RH IG SCN BLD-IMP: POSITIVE — SIGNIFICANT CHANGE UP
WBC # BLD: 10.5 K/UL — SIGNIFICANT CHANGE UP (ref 3.8–10.5)
WBC # FLD AUTO: 10.5 K/UL — SIGNIFICANT CHANGE UP (ref 3.8–10.5)

## 2019-08-18 RX ORDER — OXYCODONE HYDROCHLORIDE 5 MG/1
5 TABLET ORAL
Refills: 0 | Status: DISCONTINUED | OUTPATIENT
Start: 2019-08-18 | End: 2019-08-20

## 2019-08-18 RX ORDER — DIPHENHYDRAMINE HCL 50 MG
25 CAPSULE ORAL EVERY 6 HOURS
Refills: 0 | Status: DISCONTINUED | OUTPATIENT
Start: 2019-08-18 | End: 2019-08-20

## 2019-08-18 RX ORDER — KETOROLAC TROMETHAMINE 30 MG/ML
30 SYRINGE (ML) INJECTION ONCE
Refills: 0 | Status: DISCONTINUED | OUTPATIENT
Start: 2019-08-18 | End: 2019-08-18

## 2019-08-18 RX ORDER — SODIUM CHLORIDE 9 MG/ML
3 INJECTION INTRAMUSCULAR; INTRAVENOUS; SUBCUTANEOUS EVERY 8 HOURS
Refills: 0 | Status: DISCONTINUED | OUTPATIENT
Start: 2019-08-18 | End: 2019-08-20

## 2019-08-18 RX ORDER — AER TRAVELER 0.5 G/1
1 SOLUTION RECTAL; TOPICAL EVERY 4 HOURS
Refills: 0 | Status: DISCONTINUED | OUTPATIENT
Start: 2019-08-18 | End: 2019-08-20

## 2019-08-18 RX ORDER — LANOLIN
1 OINTMENT (GRAM) TOPICAL EVERY 6 HOURS
Refills: 0 | Status: DISCONTINUED | OUTPATIENT
Start: 2019-08-18 | End: 2019-08-20

## 2019-08-18 RX ORDER — AMPICILLIN TRIHYDRATE 250 MG
1 CAPSULE ORAL EVERY 4 HOURS
Refills: 0 | Status: DISCONTINUED | OUTPATIENT
Start: 2019-08-18 | End: 2019-08-18

## 2019-08-18 RX ORDER — OXYCODONE HYDROCHLORIDE 5 MG/1
5 TABLET ORAL ONCE
Refills: 0 | Status: DISCONTINUED | OUTPATIENT
Start: 2019-08-18 | End: 2019-08-20

## 2019-08-18 RX ORDER — AMPICILLIN TRIHYDRATE 250 MG
2 CAPSULE ORAL ONCE
Refills: 0 | Status: COMPLETED | OUTPATIENT
Start: 2019-08-18 | End: 2019-08-18

## 2019-08-18 RX ORDER — GLYCERIN ADULT
1 SUPPOSITORY, RECTAL RECTAL AT BEDTIME
Refills: 0 | Status: DISCONTINUED | OUTPATIENT
Start: 2019-08-18 | End: 2019-08-20

## 2019-08-18 RX ORDER — DOCUSATE SODIUM 100 MG
100 CAPSULE ORAL
Refills: 0 | Status: DISCONTINUED | OUTPATIENT
Start: 2019-08-18 | End: 2019-08-20

## 2019-08-18 RX ORDER — DIBUCAINE 1 %
1 OINTMENT (GRAM) RECTAL EVERY 6 HOURS
Refills: 0 | Status: DISCONTINUED | OUTPATIENT
Start: 2019-08-18 | End: 2019-08-20

## 2019-08-18 RX ORDER — PRAMOXINE HYDROCHLORIDE 150 MG/15G
1 AEROSOL, FOAM RECTAL EVERY 4 HOURS
Refills: 0 | Status: DISCONTINUED | OUTPATIENT
Start: 2019-08-18 | End: 2019-08-20

## 2019-08-18 RX ORDER — SIMETHICONE 80 MG/1
80 TABLET, CHEWABLE ORAL EVERY 4 HOURS
Refills: 0 | Status: DISCONTINUED | OUTPATIENT
Start: 2019-08-18 | End: 2019-08-20

## 2019-08-18 RX ORDER — MAGNESIUM HYDROXIDE 400 MG/1
30 TABLET, CHEWABLE ORAL
Refills: 0 | Status: DISCONTINUED | OUTPATIENT
Start: 2019-08-18 | End: 2019-08-20

## 2019-08-18 RX ORDER — HYDROCORTISONE 1 %
1 OINTMENT (GRAM) TOPICAL EVERY 6 HOURS
Refills: 0 | Status: DISCONTINUED | OUTPATIENT
Start: 2019-08-18 | End: 2019-08-20

## 2019-08-18 RX ORDER — IBUPROFEN 200 MG
600 TABLET ORAL EVERY 6 HOURS
Refills: 0 | Status: COMPLETED | OUTPATIENT
Start: 2019-08-18 | End: 2020-07-16

## 2019-08-18 RX ORDER — OXYTOCIN 10 UNIT/ML
333.33 VIAL (ML) INJECTION
Qty: 20 | Refills: 0 | Status: DISCONTINUED | OUTPATIENT
Start: 2019-08-18 | End: 2019-08-18

## 2019-08-18 RX ORDER — BENZOCAINE 10 %
1 GEL (GRAM) MUCOUS MEMBRANE EVERY 6 HOURS
Refills: 0 | Status: DISCONTINUED | OUTPATIENT
Start: 2019-08-18 | End: 2019-08-20

## 2019-08-18 RX ORDER — TETANUS TOXOID, REDUCED DIPHTHERIA TOXOID AND ACELLULAR PERTUSSIS VACCINE, ADSORBED 5; 2.5; 8; 8; 2.5 [IU]/.5ML; [IU]/.5ML; UG/.5ML; UG/.5ML; UG/.5ML
0.5 SUSPENSION INTRAMUSCULAR ONCE
Refills: 0 | Status: DISCONTINUED | OUTPATIENT
Start: 2019-08-18 | End: 2019-08-20

## 2019-08-18 RX ORDER — SODIUM CHLORIDE 9 MG/ML
1000 INJECTION, SOLUTION INTRAVENOUS
Refills: 0 | Status: DISCONTINUED | OUTPATIENT
Start: 2019-08-18 | End: 2019-08-18

## 2019-08-18 RX ORDER — ACETAMINOPHEN 500 MG
975 TABLET ORAL
Refills: 0 | Status: DISCONTINUED | OUTPATIENT
Start: 2019-08-18 | End: 2019-08-20

## 2019-08-18 RX ADMIN — Medication 1000 MILLIUNIT(S)/MIN: at 13:18

## 2019-08-18 RX ADMIN — Medication 975 MILLIGRAM(S): at 23:04

## 2019-08-18 RX ADMIN — Medication 25 MILLIGRAM(S): at 15:58

## 2019-08-18 RX ADMIN — SODIUM CHLORIDE 125 MILLILITER(S): 9 INJECTION, SOLUTION INTRAVENOUS at 11:59

## 2019-08-18 RX ADMIN — Medication 975 MILLIGRAM(S): at 15:58

## 2019-08-18 RX ADMIN — Medication 975 MILLIGRAM(S): at 23:50

## 2019-08-18 RX ADMIN — Medication 30 MILLIGRAM(S): at 13:28

## 2019-08-18 RX ADMIN — Medication 30 MILLIGRAM(S): at 13:46

## 2019-08-18 RX ADMIN — Medication 216 GRAM(S): at 11:50

## 2019-08-18 NOTE — DISCHARGE NOTE OB - MEDICATION SUMMARY - MEDICATIONS TO STOP TAKING
I will STOP taking the medications listed below when I get home from the hospital:    ibuprofen 600 mg oral tablet  -- 1 tab(s) by mouth every 6 hours, As Needed    chela

## 2019-08-18 NOTE — DISCHARGE NOTE OB - CARE PROVIDER_API CALL
Romel Vela)  Obstetrics and Gynecology  [address] [place] [address], [address]  [place] [address]  Phone: [phone]  Fax: [phone]  Follow Up Time:

## 2019-08-18 NOTE — ED ADULT TRIAGE NOTE - CHIEF COMPLAINT QUOTE
Pt is   EDC  BIB EMS water broke and she is in labor unable to obtain v/s pt not staying still   L & D called pt transferred with EMS and ED tech

## 2019-08-18 NOTE — OB RN DELIVERY SUMMARY - NSDELAYEDCLAMPA_OBGYN_ALL_OB
Family history of hypertension     Mother  Still living? Unknown  Family history of uterine fibroid, Age at diagnosis: Age Unknown
Yes

## 2019-08-18 NOTE — DISCHARGE NOTE OB - CARE PLAN
Principal Discharge DX:	Vaginal delivery  Goal:	Routine postpartum care  Assessment and plan of treatment:	follow up in 4 weeks/ regular diet & activity as tolerate  Secondary Diagnosis:	 delivery

## 2019-08-18 NOTE — DISCHARGE NOTE OB - PATIENT PORTAL LINK FT
You can access the [facility] Patient Portal, offered by [facility], by registering with the following website: [email or web address]

## 2019-08-18 NOTE — OB PROVIDER H&P - HISTORY OF PRESENT ILLNESS
30yo  @ 36.6 presents with c/o frequent contractions and spotting. Denies LOF and reports GFM.     Denies PMH/PSH    OB H/O 2010 FT   10/15/2016 FT   10/19/2018 FT   Patient does not know weights of previous deliveries    AP course uncomplicated  GBS positive

## 2019-08-18 NOTE — OB PROVIDER TRIAGE NOTE - NSHPPHYSICALEXAM_GEN_ALL_CORE
Assessment reveals VSS, abdomen soft, NT, gravid.   VE 7/100/-2  EFW 6.0 by palp    Patient desires epidural

## 2019-08-18 NOTE — OB PROVIDER TRIAGE NOTE - NS_OBGYNHISTORY_OBGYN_ALL_OB_FT
OB H/O 2010 FT   10/15/2016 FT   10/19/2018 FT   Patient does not know weights of previous deliveries

## 2019-08-18 NOTE — DISCHARGE NOTE OB - MATERIALS PROVIDED
Vaccinations/[facility] Hearing Screen Program/  Immunization Record/Breastfeeding Log/[facility]  Screening Program/Shaken Baby Prevention Handout/Birth Certificate Instructions/Breastfeeding Mother’s Support Group Information/Guide to Postpartum Care

## 2019-08-18 NOTE — CHART NOTE - NSCHARTNOTEFT_GEN_A_CORE
Patient seen at 1725 due to reports of pruritis of lower legs bilaterally and striae to abdomen. Patient reports the pruritis started at the end of pregnancy and states she was informed by Dr. Vela that pruritis is ok, no rash noted.     Assessment:  28y/o  0day @ 1259 . No medical/surgical history.   Irritation to skin along striae noted.   No rash noted to lower extremities by lower leg/ankle area, but healing areas of broken/scared skin and healing pustules due to pruritis.     Plan:  Spoke to Dr. Vela who stated that patient was negative for cholestasis in pregnancy and no need for dermatology consult as this time due to skin irritation for pruritis healing.   If pustules to develop and/or pruritis worsen will refer to dermatologist out patient.   Benadryl can be given for relief if needed.

## 2019-08-18 NOTE — OB RN TRIAGE NOTE - NS_TRIAGEADDITIONAL COMMENTS_OBGYN_ALL_OB_FT
rec'd pt via EMS.Pt very uncomfotable,requesting epidural. VE 7/100/-2 by CIARAN Bojorquez. Charge nurse Xiomara,RNC notified. Pt transferred to LDR7. Justin,CINDYC

## 2019-08-18 NOTE — OB PROVIDER TRIAGE NOTE - HISTORY OF PRESENT ILLNESS
28yo  @ 36.6 presents with c/o frequent contractions and spotting. Denies LOF and reports GFM.     Denies PMH/PSH    OB H/O 2010 FT   10/15/2016 FT   10/19/2018 FT   Patient does not know weights of previous deliveries    AP course uncomplicated  GBS positive

## 2019-08-18 NOTE — OB NEONATOLOGY/PEDIATRICIAN DELIVERY SUMMARY - NSPEDSNEONOTESA_OBGYN_ALL_OB_FT
Baby is a 36.6 week GA female born to a 29 y/o  mother via . Maternal history uncomplicated. Pregnancy uncomplicated. Maternal blood type ___. Prenatal labs N/N/NR/I. GBS positive on . SROM at 12:55 (<18hrs) with clear fluid. Baby born vigorous and crying spontaneously. Delayed cord clamping for 30 seconds. Warmed, dried, stimulated, suctioned. Apgars 9/9. Mom consents to hep B, plans to bottle feed. EOS 0.07.    Baby stable for transfer to  nursery. Parents updated. Baby is a 36.6 week GA female born to a 29 y/o  mother via . Maternal history uncomplicated. Pregnancy uncomplicated. Maternal blood type A+. Prenatal labs N/N/NR/I. GBS positive on . SROM at 12:55 (<18hrs) with clear fluid. Baby born vigorous and crying spontaneously. Delayed cord clamping for 30 seconds. Warmed, dried, stimulated, suctioned. Apgars 9/9. Mom consents to hep B, plans to bottle feed. EOS 0.07.    Baby stable for transfer to  nursery. Parents updated.

## 2019-08-19 LAB — T PALLIDUM AB TITR SER: NEGATIVE — SIGNIFICANT CHANGE UP

## 2019-08-19 RX ORDER — IBUPROFEN 200 MG
600 TABLET ORAL EVERY 6 HOURS
Refills: 0 | Status: DISCONTINUED | OUTPATIENT
Start: 2019-08-19 | End: 2019-08-20

## 2019-08-19 RX ADMIN — SODIUM CHLORIDE 3 MILLILITER(S): 9 INJECTION INTRAMUSCULAR; INTRAVENOUS; SUBCUTANEOUS at 07:57

## 2019-08-19 RX ADMIN — Medication 600 MILLIGRAM(S): at 02:16

## 2019-08-19 RX ADMIN — Medication 975 MILLIGRAM(S): at 16:39

## 2019-08-19 RX ADMIN — OXYCODONE HYDROCHLORIDE 5 MILLIGRAM(S): 5 TABLET ORAL at 04:50

## 2019-08-19 RX ADMIN — Medication 975 MILLIGRAM(S): at 04:52

## 2019-08-19 RX ADMIN — Medication 975 MILLIGRAM(S): at 05:35

## 2019-08-19 RX ADMIN — SODIUM CHLORIDE 3 MILLILITER(S): 9 INJECTION INTRAMUSCULAR; INTRAVENOUS; SUBCUTANEOUS at 04:01

## 2019-08-19 RX ADMIN — Medication 600 MILLIGRAM(S): at 02:50

## 2019-08-19 RX ADMIN — Medication 600 MILLIGRAM(S): at 13:38

## 2019-08-19 RX ADMIN — OXYCODONE HYDROCHLORIDE 5 MILLIGRAM(S): 5 TABLET ORAL at 05:30

## 2019-08-19 RX ADMIN — Medication 600 MILLIGRAM(S): at 12:38

## 2019-08-19 RX ADMIN — Medication 975 MILLIGRAM(S): at 21:15

## 2019-08-19 RX ADMIN — Medication 1 TABLET(S): at 12:38

## 2019-08-19 RX ADMIN — Medication 975 MILLIGRAM(S): at 15:39

## 2019-08-19 RX ADMIN — Medication 975 MILLIGRAM(S): at 20:36

## 2019-08-19 NOTE — PROGRESS NOTE ADULT - SUBJECTIVE AND OBJECTIVE BOX
Anesthesia Post-op Note    POD#1 S/P epidural    Patient is doing well.  OOBAA. Tolerating clears.  Pain is tolerable.  No residual anesthetic issues or complications noted.

## 2019-08-20 VITALS
OXYGEN SATURATION: 100 % | HEART RATE: 69 BPM | SYSTOLIC BLOOD PRESSURE: 110 MMHG | TEMPERATURE: 98 F | RESPIRATION RATE: 18 BRPM | DIASTOLIC BLOOD PRESSURE: 61 MMHG

## 2019-08-20 RX ADMIN — OXYCODONE HYDROCHLORIDE 5 MILLIGRAM(S): 5 TABLET ORAL at 01:59

## 2019-08-20 RX ADMIN — Medication 1 TABLET(S): at 09:42

## 2019-08-20 RX ADMIN — Medication 600 MILLIGRAM(S): at 06:46

## 2019-08-20 RX ADMIN — OXYCODONE HYDROCHLORIDE 5 MILLIGRAM(S): 5 TABLET ORAL at 01:37

## 2019-08-20 RX ADMIN — Medication 975 MILLIGRAM(S): at 10:40

## 2019-08-20 RX ADMIN — Medication 600 MILLIGRAM(S): at 06:17

## 2019-08-20 RX ADMIN — Medication 600 MILLIGRAM(S): at 16:11

## 2019-08-20 RX ADMIN — Medication 975 MILLIGRAM(S): at 09:42

## 2019-08-20 RX ADMIN — Medication 600 MILLIGRAM(S): at 17:00

## 2020-02-16 ENCOUNTER — EMERGENCY (EMERGENCY)
Facility: HOSPITAL | Age: 30
LOS: 1 days | Discharge: HOME | End: 2020-02-16
Admitting: EMERGENCY MEDICINE
Payer: MEDICAID

## 2020-02-16 VITALS
OXYGEN SATURATION: 100 % | TEMPERATURE: 98 F | HEART RATE: 84 BPM | DIASTOLIC BLOOD PRESSURE: 69 MMHG | RESPIRATION RATE: 17 BRPM | SYSTOLIC BLOOD PRESSURE: 125 MMHG

## 2020-02-16 PROCEDURE — 99053 MED SERV 10PM-8AM 24 HR FAC: CPT

## 2020-02-16 PROCEDURE — 99283 EMERGENCY DEPT VISIT LOW MDM: CPT

## 2020-02-16 RX ORDER — KETOROLAC TROMETHAMINE 30 MG/ML
30 SYRINGE (ML) INJECTION ONCE
Refills: 0 | Status: DISCONTINUED | OUTPATIENT
Start: 2020-02-16 | End: 2020-02-16

## 2020-02-16 RX ORDER — CYCLOBENZAPRINE HYDROCHLORIDE 10 MG/1
10 TABLET, FILM COATED ORAL ONCE
Refills: 0 | Status: COMPLETED | OUTPATIENT
Start: 2020-02-16 | End: 2020-02-16

## 2020-02-16 RX ADMIN — Medication 30 MILLIGRAM(S): at 22:53

## 2020-02-16 NOTE — ED PROVIDER NOTE - NSFOLLOWUPCLINICS_GEN_ALL_ED_FT
[facility]  Orthopedic Surgery  [address] [place] [address]  Phone: [phone]  Fax:   Follow Up Time:     [facility] Orthopedic Surgery  Orthopedic Surgery  [address], 3rd & 4th [place] [address]  Phone: [phone]  Fax:   Follow Up Time:     [facility] [place]  Orthopedics  .  [place]   Phone: [phone]  Fax:   Follow Up Time:     [facility]  Orthopedic Surgery  [address] [address]  Phone: [phone]  Fax:   Follow Up Time:     [facility]  Orthopedic Surgery  [address] [place] [address]  Phone: [phone]  Fax:   Follow Up Time:     [facility]  Orthopedic Surgery  [address] [place] [address]  Phone: [phone]  Fax:   Follow Up Time:

## 2020-02-16 NOTE — ED PROVIDER NOTE - NSFOLLOWUPINSTRUCTIONS_ED_ALL_ED_FT
Advance activity as tolerated.  Continue all previously prescribed medications as directed unless otherwise instructed.  Follow up with your primary care physician in 48-72 hours- bring copies of your results.  Return to the ER for worsening or persistent symptoms, and/or ANY NEW OR CONCERNING SYMPTOMS. If you have issues obtaining follow up, please call: [phone] to obtain a doctor or specialist who takes your insurance in your area.  You may call [phone] to make an appointment with the internal medicine clinic.     Follow up with your primary care provider within 48 hours  Follow up with an orthopedic doctor within one week  Take Motrin 600mg every 8 hours as needed for pain, take with food  Return to the emergency department with any worsening or concerning symptoms, swelling, numbness/tingling, inability to bear weight or any other concerns.

## 2020-02-16 NOTE — ED PROVIDER NOTE - CARE PLAN
Principal Discharge DX:	Musculoskeletal pain  Assessment and plan of treatment:	Advance activity as tolerated.  Continue all previously prescribed medications as directed unless otherwise instructed.  Follow up with your primary care physician in 48-72 hours- bring copies of your results.  Return to the ER for worsening or persistent symptoms, and/or ANY NEW OR CONCERNING SYMPTOMS. If you have issues obtaining follow up, please call: [phone] to obtain a doctor or specialist who takes your insurance in your area.  You may call [phone] to make an appointment with the internal medicine clinic.

## 2020-02-16 NOTE — ED PROVIDER NOTE - PLAN OF CARE
Advance activity as tolerated.  Continue all previously prescribed medications as directed unless otherwise instructed.  Follow up with your primary care physician in 48-72 hours- bring copies of your results.  Return to the ER for worsening or persistent symptoms, and/or ANY NEW OR CONCERNING SYMPTOMS. If you have issues obtaining follow up, please call: [phone] to obtain a doctor or specialist who takes your insurance in your area.  You may call [phone] to make an appointment with the internal medicine clinic.
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
## 2020-02-16 NOTE — ED PROVIDER NOTE - PATIENT PORTAL LINK FT
You can access the FollowMyHealth Patient Portal offered by [facility] by registering at the following website: [email or web address]. By joining [employer]’s FollowMyHealth portal, you will also be able to view your health information using other applications (apps) compatible with our system.

## 2020-04-21 NOTE — OB RN PATIENT PROFILE - EXTENSIONS OF SELF_ADULT
Learning About Birth Control  What is birth control? Birth control is any method used to prevent pregnancy. Another word for birth control is contraception. If you have sex without birth control, there is a chance that you could get pregnant. This is true even if you have not started having periods yet or you are getting close to menopause. The only sure way to prevent pregnancy is to not have sex. But finding a good method of birth control that you are comfortable with can help you avoid an unplanned pregnancy. Be sure to tell your doctor about any health problems you have or medicines you take. He or she can help you choose the birth control method that is right for you. What are the types of birth control? There are many different kinds of birth control. Each has pros and cons. Learning about all the methods will help you find one that is right for you. · Long-acting reversible contraception (LARC) is the most effective reversible method you can use to prevent pregnancy. If you decide you want to get pregnant, you can have them removed. LARCs are implants and intrauterine devices (IUDs). While they are being used, they usually prevent pregnancy for years. ? Implants are placed under the skin of the arm. They release the hormone progestin and prevent pregnancy for about 3 years. ? IUDs are placed in the uterus by a doctor. There are two main types of IUDsthe copper IUD and the hormonal IUD. The hormonal IUD releases progestin. IUDs prevent pregnancy for 3 to 10 years, depending on the type. · Hormonal methods are very good at preventing pregnancy. Combination birth control pills (\"the pill\"), skin patches, and vaginal rings release the hormones estrogen and progestin. Shots, mini-pills, hormonal IUDs, and implants release progestin only. · Barrier methods generally do not prevent pregnancy as well as IUDs or hormonal methods do.  Barrier methods include condoms, diaphragms, cervical caps, and sponges. You must use barrier methods every time you have sex. · Natural family planning can work if you and your partner are very careful and you have a regular ovulation cycle. You will need to keep good records so you know when you are most likely to become pregnant (you are fertile). And during times you are fertile, you will need to not have sex or to use a barrier method. Natural family planning is also known as fertility awareness and the rhythm method. · Permanent birth control (sterilization) gives you lasting protection against pregnancy. A man can have a vasectomy, or a woman can have her tubes tied (tubal ligation). But this is only a good choice if you are sure that you don't want any (or any more) children. · Emergency contraception is a backup method to prevent pregnancy if you didn't use birth control or a condom breaks. The most effective emergency contraception is prescribed by a doctor. This includes the copper IUD (inserted by a doctor) or a prescription pill. You can also get emergency contraceptive pills without a prescription at most drugstores. How do you choose the best method? The best method of birth control is one that protects you every time you have sex. This usually depends on how well you use it. To find a method that will work best for you, think about:  · How well it works. Think about how important it is to you to avoid pregnancy. Then look at how well each method works. For example, if you plan to have a child soon anyway, you may not need a very reliable method. If you don't want children but feel it is wrong to end a pregnancy, choose a type of birth control that works very well. · How much effort it takes. For example, birth control pills may not be a good choice if you often forget to take medicine. Or, if you are not sure you will stop and use a barrier method each time you have sex, pick another method. · How much the method costs.  For example, condoms are cheap or free in some clinics. Some insurance companies cover the cost of prescription birth control. But cost can sometimes be misleading. An IUD costs a lot up front. But it works for years, making it low-cost over time. · Whether it protects you from infection. Latex condoms can help protect you from sexually transmitted infections (STIs), such as herpes or HIV/AIDS. But they are not the best way to prevent pregnancy. To avoid both STIs and pregnancy, use condoms along with another type of birth control. · Whether you've had a problem with one kind of birth control. Finding the best method of birth control may involve trying something different. Also, you may need to change a method that once worked well for you. · Whether you want children. If you are positive you don't want children, a lasting method of birth control might be best.  · Your health issues. Some birth control methods may not be safe for you, depending on your health issues. For example, women who smoke, are breastfeeding, or have had breast cancer may not be able to use certain methods. How can you get birth control? · You can buy:  ? Condoms, sponges, and spermicides without a prescription in drugstores, online, and in many grocery stores. ? Some forms of emergency contraception without a prescription at most drugstores. · You need to see a doctor or visit a family planning clinic to:  ? Get a prescription for birth control pills and other methods that use hormones. ? Have an implant or IUD inserted, including the type of IUD used for emergency contraception. ? Get a hormone shot. ? Get a prescription for a diaphragm or cervical cap. ? Get a prescription for certain kinds of emergency contraception. Where can you learn more? Go to http://fabiano-kris.info/  Enter X152 in the search box to learn more about \"Learning About Birth Control. \"  Current as of: May 29, 2019Content Version: 12.4  © 2148-8630 [employer]. Care instructions adapted under license by [employer] (which disclaims liability or warranty for this information). If you have questions about a medical condition or this instruction, always ask your healthcare professional. [address] any warranty or liability for your use of this information. None

## 2020-12-03 NOTE — OB RN DELIVERY SUMMARY - NS_RNDELIVATTEST_OBGYN_ALL_OB
OB Provider reported that the vagina was inspected and no foreign body was found/Laps, needles and instrument count was correct cognition/impaired balance/decreased flexibility/decreased strength

## 2021-01-22 NOTE — ED PROVIDER NOTE - NEUROLOGICAL, MLM
-- DO NOT REPLY / DO NOT REPLY ALL --  -- Message is from the [facility]--    COVID-19 Universal Screening: N/A - Not about scheduling    General Patient Message      Reason for Call: Patients spouse has an appointment on 1/25/21 at 4:30 pm and she would like to know if her  can be seen at the same time since they have the same doctor.  She would like a call back to discuss.        Alternative phone [phone]      Turnaround time given to caller:   \"This message will be sent to [state Provider's name]. The clinical team will fulfill your request as soon as they review your message.\"     Alert and oriented, no focal deficits, no motor or sensory deficits.

## 2021-09-02 NOTE — OB NEONATOLOGY/PEDIATRICIAN DELIVERY SUMMARY - NS_FINALEDD_OBGYN_ALL_OB_DT
09-Sep-2019
[FreeTextEntry1] : Patient has difficulty with balance and is now walking with a cane.  Since his last visit he has had no further episodes of syncope.  While he was in [facility] 3/21 he had an echocardiogram that was normal.  Normal CBC and chemistries.\par \par The patient not sure exactly what blood pressure medicine he is taking he will find out at home and call us so that we can put it in his chart.  He is taking losartan but not sure about the water pill.  He still has 1+ ankle edema.  Blood pressure is borderline high today.\par \par We discussed the importance of lifestyle.  Needs to be careful not to continue to gain weight.  If he has any problems or symptoms he would call me.  If all is well I will see him in 6 months.

## 2022-03-15 NOTE — OB PROVIDER H&P - NS_TRIAGEMEMBRANE_OBGYN_ALL_OB
8800) Bedside shift change report given to Delmi Ricci (oncoming nurse) by Sofi Qureshi RN (offgoing nurse). Report included the following information SBAR, Kardex and MAR.  0845) pt eating breakfast  0915) pt request coffee. Discuss able to start antibiotics in a few minutes. 1025) pt urine output 150 mL, cloudy. Pt stated he does not feel like he needs a straight cath at this time. 1140) pt refuse bladder scan right now but said he would need the catheter soon  1230) pt refused bladder scan  1300) pt request bags in anticipation of discharge tomorrow. [phone]) Discuss with Cecilio dietician, pt education provided. 1430) pt stated he would be ready for catheter after antibiotic. Discuss bladder scanning and then called MD for order to straight cath  1540) Discuss with Dr. Lien Felix pt bladder scan 471 mL. Order for straight cath four times a day. 1630) Pt . Pt talking on phone, discussing how he was \"going on a diet\" and \"giving up soda. \"   0275) pt walking in hallway wearing new QRVG  8277) pt walking in room  1930) Bedside shift change report given to [facility], RN (oncoming nurse) by Rachana Villarreal RN (offgoing nurse). Report included the following information SBAR, Kardex and MAR. Intact

## 2022-05-11 NOTE — DISCHARGE NOTE OB - FUNCTIONAL SCREEN CURRENT LEVEL: AMBULATION, MLM
Review of Systems   Constitutional: Negative. HENT: Negative. Eyes: Negative. Respiratory: Negative. Cardiovascular: Negative. Gastrointestinal: Negative. Negative for abdominal distention. Endocrine: Negative. Genitourinary: Negative. Musculoskeletal: Negative. Skin: Negative. Allergic/Immunologic: Negative. Neurological: Negative. Hematological: Negative. Psychiatric/Behavioral: Negative. 0 = independent

## 2022-06-09 NOTE — OB RN PATIENT PROFILE - HEIGHT IN FEET
Anesthesia Pre Eval Note    Anesthesia ROS/Med Hx    Overall Review:  EKG was reviewed     Anesthetic Complication History:  Patient does not have a history of anesthetic complications      Pulmonary Review:    Positive for sleep apnea - CPAP and noncompliant    Neuro/Psych Review:    Positive for headaches  Positive for psychiatric history - Depression and Anxiety    Cardiovascular Review:  Exercise tolerance: good (>4 METS)  Positive for hypertension  Positive for hyperlipidemia    GI/HEPATIC/RENAL Review:    Positive for GERD  Positive for hepatitis- type B   Positive for liver disease     End/Other Review:  Positive for diabetes - type 2 - poorly controlled  Positive for obesity class II - 35.00 - 39.99  Positive for arthritis  Positive for chronic pain  Additional Results:  EKG:  No results found for this or any previous visit (from the past 4464 hour(s)).       Relevant Problems   No relevant active problems       Physical Exam     Airway   Mallampati: II  TM Distance: >3 FB  Neck ROM: Full  Neck: Able to place in sniff position  TMJ Mobility: Good    Cardiovascular  Cardiovascular exam normal    General Assessment  General Assessment: Alert and oriented and No acute distress    Dental Exam  Dental exam normal    Pulmonary Exam  Pulmonary exam normal    Abdominal Exam    Patient Demonstrates:  Obese      Anesthesia Plan:    ASA Status: 2  Anesthesia Type: General    Induction: Intravenous  Preferred Airway Type: ETT  Premedication: Oral and IV      Post-op Pain Management: Per Surgeon      Checklist  Reviewed: Lab Results, Patient Summary, Allergies, Past Med History, Medications, Nursing Notes, NPO Status and Problem list  Consent/Risks Discussed Statement:  The proposed anesthetic plan, including its risks and benefits, have been discussed with the Patient along with the risks and benefits of alternatives. Questions were encouraged and answered and the patient and/or representative understands and agrees to  proceed.        I discussed with the patient (and/or patient's legal representative) the risks and benefits of the proposed anesthesia plan, General, which may include services performed by other anesthesia providers.    Alternative anesthesia plans, if available, were reviewed with the patient (and/or patient's legal representative). Discussion has been held with the patient (and/or patient's legal representative) regarding risks of anesthesia, which include emergent situations that may require change in anesthesia plan.  The patient (and/or patient's legal representative) has indicated understanding, his/her questions have been answered, and he/she wishes to proceed with the planned anesthetic.      Blood Products: Not Anticipated     5

## 2023-03-29 NOTE — OB RN DELIVERY SUMMARY - BABY A: WEIGHT IN OUNCES (FROM GRAMS), DELIVERY
Dupixent Pregnancy And Lactation Text: This medication likely crosses the placenta but the risk for the fetus is uncertain. This medication is excreted in breast milk. 5

## 2023-04-17 NOTE — OB PROVIDER DELIVERY SUMMARY - AMNIOTIC FLUID COLOR, LABOR
clear Itraconazole Pregnancy And Lactation Text: This medication is Pregnancy Category C and it isn't know if it is safe during pregnancy. It is also excreted in breast milk.

## 2023-05-09 NOTE — PATIENT PROFILE OB - FUNCTIONAL SCREEN CURRENT LEVEL: TRANSFERRING, MLM
Patient: Linda Chavez    Procedure: Procedure(s):  DILATION AND CURETTAGE OF UTERUS USING SUCTION       Diagnosis: 10 weeks gestation of pregnancy [Z3A.10]  Missed  [O02.1]  Diagnosis Additional Information: No value filed.    Anesthesia Type:   MAC     Note:    Oropharynx: oropharynx clear of all foreign objects and spontaneously breathing  Level of Consciousness: awake  Oxygen Supplementation: face mask  Level of Supplemental Oxygen (L/min / FiO2): 6  Independent Airway: airway patency satisfactory and stable  Dentition: dentition unchanged  Vital Signs Stable: post-procedure vital signs reviewed and stable  Report to RN Given: handoff report given  Patient transferred to: PACU    Handoff Report: Identifed the Patient, Identified the Reponsible Provider, Reviewed the pertinent medical history, Discussed the surgical course, Reviewed Intra-OP anesthesia mangement and issues during anesthesia, Set expectations for post-procedure period and Allowed opportunity for questions and acknowledgement of understanding      Vitals:  Vitals Value Taken Time   BP     Temp     Pulse     Resp     SpO2 96 % 23 1140   Vitals shown include unvalidated device data.    Electronically Signed By: AMA Escalona CRNA  May 9, 2023  11:41 AM   0 = independent

## 2023-05-26 NOTE — ED ADULT NURSE NOTE - CHPI ED SYMPTOMS POS
Pt notified   Scheduled biopsy  They ahd asked if she was on blood thinner, pt only on asprin , should she stop ? FEVER/VOMITING/NAUSEA

## 2023-07-14 NOTE — OB PROVIDER TRIAGE NOTE - NSPREVIOUSLIVEBIRTHSNOWLIVING_OBGYN_ALL_OB_NU
----- Message from Sanjeev Ruiz MD sent at 7/13/2023  4:44 PM EDT -----  Inform pt of abnormal test result. PSA is good. His urinalysis has white cells. Is he having any voiding symptoms such as burning or other signs of infection? Let me know as if he is symptomatic we will check a urine culture and start an antibiotic prior to his office visit. 3

## 2023-07-28 NOTE — ED ADULT NURSE NOTE - PAIN RATING/NUMBER SCALE (0-10): ACTIVITY
-Will see urology outpatient  -would benefit from likely urogyn -Will see urology outpatient  -would benefit from likely urogyn 4

## 2023-11-13 NOTE — OB RN TRIAGE NOTE - NS_PRENATALHARD_OBGYN_ALL_OB
[facility] Hospitalist Progress Note      Subjective  Patient inadvertently dislocated his left shoulder again while trying to reach over for something    Objective    Current Meds  Current Facility-Administered Medications   Medication Dose Route Frequency Provider Last Rate Last Admin   • insulin glargine (LANTUS) injection 5 Units  5 Units Subcutaneous Nightly OroBranden escalera J       • cyclobenzaprine (FLEXERIL) tablet 5 mg  5 mg Oral TID PRN Gavin Lora MD   5 mg at 11/12/23 2314   • moxifloxacin (VIGAMOX) 0.5 % ophthalmic solution 1 drop  1 drop Right Eye 3 times per day Darryl Velásquez MD   1 drop at 11/13/23 0500   • furosemide (LASIX INJECT) injection 80 mg  80 mg Intravenous BID Tyler Joshua MBBS   80 mg at 11/13/23 0829   • allopurinol (ZYLOPRIM) tablet 100 mg  100 mg Oral Daily Oropezamota, Branden J   100 mg at 11/13/23 0828   • [Held by provider] empagliflozin (JARDIANCE) tablet 10 mg  10 mg Oral Daily Oropezamota, Branden J   10 mg at 11/10/23 0805   • fluticasone-vilanterol (BREO ELLIPTA) 100-25 MCG/ACT inhaler 1 puff  1 puff Inhalation Daily Resp Oropezamota, Branden J   1 puff at 11/13/23 0838   • montelukast (SINGULAIR) tablet 10 mg  10 mg Oral Daily Oropezamota, Branden J   10 mg at 11/13/23 0828   • rosuvastatin (CRESTOR) tablet 20 mg  20 mg Oral Daily Oropezamota, Branden J   20 mg at 11/13/23 0828   • [Held by provider] sacubitril-valsartan (ENTRESTO) 24-26 MG per tablet 1 tablet  1 tablet Oral BID OropezamoBranden carmichael J       • sodium chloride 0.9 % flush bag 25 mL  25 mL Intravenous PRN OropezamoGómez carmichaelar J       • sodium chloride 0.9 % injection 2 mL  2 mL Intracatheter 2 times per day OropezaBranden canales J   2 mL at 11/12/23 2111   • sodium chloride (NORMAL SALINE) 0.9 % bolus 500 mL  500 mL Intravenous PRN OropezamoBranden carmichael       • dextrose 50 % injection 25 g  25 g Intravenous PRN OropezamoBranden carmichael       • dextrose 50 % injection 12.5 g  12.5 g Intravenous PRN Oropezamota,  Branden GUPTA       • glucagon (GLUCAGEN) injection 1 mg  1 mg Intramuscular PRN OropezaBranden canales       • dextrose (GLUTOSE) 40 % gel 15 g  15 g Oral PRN OroBranden escalera       • dextrose (GLUTOSE) 40 % gel 30 g  30 g Oral PRN Oropezamoamol, Branden GUPTA       • heparin (porcine) injection 5,000 Units  5,000 Units Subcutaneous 3 times per day OropezaBranden canales   5,000 Units at 11/13/23 0500   • acetaminophen (TYLENOL) tablet 650 mg  650 mg Oral Q4H PRN OroBranden escalera   650 mg at 11/13/23 0313   • Potassium Standard Replacement Protocol (Levels 3.5 and lower)   Does not apply See Admin Instructions Branden England       • Magnesium Standard Replacement Protocol   Does not apply See Admin Instructions Branden England       • insulin lispro (ADMELOG,HumaLOG) - Correction Dose   Subcutaneous TID WC OroBranden escalera   1 Units at 11/13/23 1238   • insulin lispro (ADMELOG,HumaLOG) - Correction Dose   Subcutaneous Nightly OroBranden escalera   4 Units at 11/12/23 2111   • sodium chloride 0.9 % flush bag 25 mL  25 mL Intravenous PRN OroBranden escalera       • sodium chloride 0.9 % injection 2 mL  2 mL Intracatheter 2 times per day Branden England   2 mL at 11/13/23 0829   • [Held by provider] ruxolitinib (JAKAFI) tablet 15 mg  15 mg Oral Q12H Branden England       • HYDROcodone-acetaminophen (NORCO) 5-325 MG per tablet 1 tablet  1 tablet Oral Q4H PRN OroBranden escalera   1 tablet at 11/13/23 0442        I/O's    Intake/Output Summary (Last 24 hours) at 11/13/2023 1313  Last data filed at 11/13/2023 1100  Gross per 24 hour   Intake 120 ml   Output 2020 ml   Net -1900 ml       Last Recorded Vitals  Vitals with min/max:    Vital Last Value 24 Hour Range   Temperature 97.3 °F (36.3 °C) (11/13/23 1100) Temp  Min: 97.3 °F (36.3 °C)  Max: 98.4 °F (36.9 °C)   Pulse (!) 33 (11/13/23 1100) Pulse  Min: 33  Max: 96   Respiratory 16 (11/13/23 1100) Resp  Min: 16  Max: 20   Non-Invasive  Blood Pressure  104/60 (11/13/23 1100) BP  Min: 93/63  Max: 130/91   Pulse Oximetry 96 % (11/13/23 1100) SpO2  Min: 92 %  Max: 99 %   Arterial   Blood Pressure   No data recorded        Body mass index is 37.8 kg/m².    Physical Exam   GENERAL:  In no apparent distress  CHEST:  Chest with clear breath sounds bilaterally.   CARDIAC:  Regular rate and rhythm.  S1 and S2  VASCULAR:  No Edema.  Peripheral pulses normal and equal in all extremities  ABDOMEN:  Soft, without detectable tenderness.  No sign of distention.  No   rebound or guarding, and no masses palpated.  Left shoulder sling in place: +1 pitting edema  PSYCH:  no depression or anxiety     Labs   .  Recent Labs   Lab 11/13/23  0557 11/12/23  0511 11/11/23  0527 11/09/23  0508 11/08/23  1009   SODIUM 133* 135 135   < > 137   CHLORIDE 94* 95* 95*   < > 102   CO2 25 26 28   < > 25   * 89* 77*   < > 57*   CREATININE 3.22* 3.09* 2.95*   < > 2.45*   CALCIUM 9.4 9.5 9.4   < > 9.1   ALBUMIN  --   --   --   --  3.0*   BILIRUBIN  --   --   --   --  0.5   ALKPT  --   --   --   --  98   GPT  --   --   --   --  49   AST  --   --   --   --  37   GLUCOSE 237* 173* 169*   < > 192*    < > = values in this interval not displayed.    .    Intake/Output Summary (Last 24 hours) at 11/13/2023 1313  Last data filed at 11/13/2023 1100  Gross per 24 hour   Intake 120 ml   Output 2020 ml   Net -1900 ml      WBC (K/mcL)   Date Value   11/13/2023 8.7     RBC (mil/mcL)   Date Value   11/13/2023 3.03 (L)     HCT (%)   Date Value   11/13/2023 28.9 (L)     HGB (g/dL)   Date Value   11/13/2023 9.3 (L)     PLT (K/mcL)   Date Value   11/13/2023 303       Imaging  TRANSTHORACIC ECHO (TTE) LIMITED W/ W/O IMAGING AGENT    Result Date: 11/9/2023  *[facility]* [address] [place] [id number] Phone [phone] Fax [phone] Limited Transthoracic Echocardiogram (TTE) Patient: Moo Mcrae     Study Date/Time:        Nov 9 2023 9:43AM MRN:     [id number]              FIN#:                   [id number] :     1955          Ht/Wt:                  160cm 96kg Age:     68                  BSA/BMI:                2.11m^2 37.5kg/m^2 Gender:  M                   Baseline BP:            94 / 52 Ordering Physician:   Branden England  Referring Physician:  Branden England Cesar J  Attending Physician:  Maycol Gutierrez Performing Physician: Tin Chang MD Diagnostic Physician: Tni Chang MD Sonographer:          Rick Mack [facility]  ------------------------------------------------------------------------------ INDICATIONS:   Syncope. ------------------------------------------------------------------------------ STUDY CONCLUSIONS SUMMARY: 1. Left ventricle: The cavity size is severely dilated. Wall thickness is    normal. Systolic function is severely reduced. There is severe diffuse    hypokinesis. Akinesis of the mid/distla anterolateral wall. Due to poor    visualization, cannot rule out severe hypokinesis/akinesis of apex. Cannot    exclude a thrombus revealed by acoustic contrast opacification. The    ejection fraction is 20%. 2. Right ventricle: The cavity size is normal. Systolic function is normal. 3. Inferior vena cava: The IVC is normal-sized. Respirophasic diameter changes    are in the normal range (>= 50%). IMPRESSIONS:  Unable to view images from prior study 2016. Compared to the report from that study, LV systolic function appears to have worsened. There appear to be new regional wall motion abnormalities. ------------------------------------------------------------------------------ STUDY DATA:  Comparison is made to the study of 2016.  Procedure:  A transthoracic echocardiogram was performed. Image quality was suboptimal. The study was technically limited due to poor acoustic window availability, restricted patient mobility, and body habitus. Intravenous contrast (Definity 2ml) was administered to opacify the left ventricle.   Limited 2D, limited spectral Doppler, color Doppler, and intravenous contrast injection.  Study status:  Routine.  Study completion:  There were no complications. FINDINGS BASELINE ECG:   Normal sinus rhythm with wide QRS escape. LEFT VENTRICLE:  Not well visualized. The cavity size is severely dilated. Wall thickness is normal. Systolic function is severely reduced. There is severe diffuse hypokinesis. Akinesis of the mid/distal anterolateral wall. Due to poor visualization, cannot rule out severe hypokinesis/akinesis of apex. Cannot exclude a thrombus revealed by acoustic contrast opacification.    The ejection fraction was measured by visual estimation. The ejection fraction is 20%. AORTIC VALVE:  Poorly visualized.  The valve is probably trileaflet. AORTA: Aortic root: The root is normal-sized. Ascending aorta: The vessel is normal-sized. MITRAL VALVE:  Poorly visualized. The annulus is mildly calcified. ATRIAL SEPTUM:  Not well visualized. LEFT ATRIUM:  Poorly visualized. The atrium is normal in size. RIGHT VENTRICLE:  Poorly visualized. The cavity size is normal. Systolic function is normal.  Systolic pressure is indeterminant due to the absence of tricuspid regurgitation. VENTRICULAR SEPTUM:   Thickness is normal. Septal motion is moderately dyssynergic. PULMONIC VALVE:   Poorly visualized. There is trivial regurgitation. TRICUSPID VALVE:  Poorly visualized. There is trivial regurgitation. PULMONARY ARTERIES: Not well visualized.          The estimated central venous pressure is 3mm Hg. PERICARDIUM:   There is no pericardial effusion. SYSTEMIC VEINS: Inferior vena cava: The IVC is normal-sized.  Respirophasic diameter changes are in the normal range (>= 50%). ------------------------------------------------------------------------------ Measurements  Left ventricle         Value        Ref        Left atrium                Value        Ref  SIL, LAX chord     (H) 7.1   cm     4.2 - 5.8  AP dim, ES              (N) 3.9   cm     3.0 - 4.0  ESD, LAX chord     (H) 6.3   cm     2.5 - 4.0  AP dim index, ES       (N) 1.8   cm/m^2 1.5 - 2.3  SIL/bsa, LAX chord (H) 3.4   cm/m^2 2.2 - 3.0  ESD/bsa, LAX chord (H) 3.0   cm/m^2 1.3 - 2.1  Aortic root                Value        Ref  PW, ED, LAX        (N) 0.9   cm     0.6 - 1.0  Root diam, ED          (L) 2.3   cm     2.8 - 4.2  IVS, ED            (N) 0.9   cm     0.6 - 1.0  S-T junct diam, ED     (N) 2.6   cm     2.3 - 3.5  PW, ED             (N) 0.9   cm     0.6 - 1.0  S-T junct diam/bsa, ED (N) 1.2   cm/m^2 1.1 - 1.9  IVS/PW, ED             1.07         ---------  EDV                (H) 356   ml     62 - 150   Ascending aorta            Value        Ref  ESV                (H) 250   ml     21 - 61    AAo AP diam, ED        (N) 3.0   cm     2.2 - 3.8  EF                 (L) 20    %      52 - 72    AAo AP diam/bsa, ED    (N) 1.4   cm/m^2 1.1 - 1.9  SV                     62    ml     ---------  EDV/bsa            (H) 169   ml/m^2 34 - 74    Systemic veins             Value        Ref  ESV/bsa            (H) 118   ml/m^2 11 - 31    Estimated CVP              3     mm Hg  ---------  SV/bsa                 29    ml/m^2 ---------                                                 Inferior vena cava         Value        Ref  LVOT                   Value        Ref        Diam                   (N) 1.8   cm     <=2.1  Diam, S                2.1   cm     ---------  Area                   3.5   cm^2   --------- Legend: (L)  and  (H)  mariela values outside specified reference range. (N)  marks values inside specified reference range. Electronically signed by Tin Chang MD 11/09/2023 12:57 Prior Signatures: Attending Reviewed by Tin Chang MD - 11/9/2023 12:56:52 PM Prior Signatures: Preliminary Reviewed by Carmen Gomez - 11/9/2023 12:16:24 PM    XR CHEST AP OR PA    Result Date: 11/8/2023  Portable chest HISTORY: Fall. Heart failure. COMPARISON: 11/8/2023, earlier study      DESCRIPTION/IMPRESSION: Artifact from the patient's forearm and hand. Lungs less well-inflated. Few new linear and discoid atelectatic type opacities in the inferior right lower lobe. No pleural effusion. No pneumothorax. No vascular congestion. The study is not diagnostic for heart size. Bone is osteopenic. Electronically Signed by: STAN EWING M.D. Signed on: 11/8/2023 8:21 PM Workstation ID: [id number]    XR SHOULDER 3 VIEWS LEFT    Result Date: 11/8/2023  EXAM: XR SHOULDER 3 VIEWS LEFT CLINICAL INDICATION: Reduction. COMPARISON: November 8, 2023.     FINDINGS AND IMPRESSION: Previously noted anterior dislocation of the left glenohumeral joint has been reduced. No evidence of acute or healing fracture, dislocation, aggressive osseous lesion or periosteal reaction. Overlying soft tissues appear unremarkable. Electronically Signed by: ALISSA ALMANZA M.D. Signed on: 11/8/2023 7:57 PM Workstation ID: [id number]    XR SHOULDER 2 VIEWS LEFT    Result Date: 11/8/2023  Left shoulder, frontal and Y views, 2 images HISTORY: Recurrent shoulder dislocation. COMPARISON: 11/8/2023     DESCRIPTION/IMPRESSION: Recurrent total anterior subcoracoid left shoulder joint dislocation. No visible fracture on these 2 limited views. Electronically Signed by: STAN EWING M.D. Signed on: 11/8/2023 4:28 PM Workstation ID: [id number]    CT HEAD WO CONTRAST    Result Date: 11/8/2023  CT HEAD WITHOUT CONTRAST CLINICAL INDICATION: Fall, syncope, head injury COMPARISON: CT sinuses without contrast February 13, 2012 TECHNIQUE: Multiple axial images of the head were obtained from the base of the skull to the vertex. Automated exposure control employed as radiation dose reduction strategy on this patient. FINDINGS: The gray-white matter differentiation is maintained. There is no evidence of acute intraparenchymal or extra-axial hemorrhage. No areas of abnormal parenchymal attenuation are identified. The ventricles and sulci  are within normal limits for patient age. There is no midline shift or mass effect. The basal cisterns are intact. The craniocervical junction is unremarkable. The orbital structures appear symmetric and unremarkable. Stable expansile lytic lesion involving the right posterior maxillary alveolar process with lateral wall erosion. There are no acute fractures or aggressive bony lesions.     No acute fracture. No acute intracranial abnormality. Noncontrast enhanced CT is a screening survey. Conditions such as vascular malformations, aneurysms, low grade, tumors, meningitis, subtle subarachnoid hemorrhages, etc., may not be demonstrated. Followup studies as clinically indicated may be necessary. Dictated by: TERESA CASTANEDA MD Dictated on: 11/8/2023 10:53 AM IRADHA MD, have reviewed the images and report and concur with these findings interpreted by TERESA CASTANEDA MD. Electronically Signed by: RADHA AJ MD Signed on: 11/8/2023 1:43 PM Workstation ID: [id number]    XR SHOULDER 3 VIEWS LEFT    Result Date: 11/8/2023  EXAM:  XR SHOULDER 3 VIEWS LEFT  CLINICAL INDICATION: Post reduction anterior shoulder joint dislocation COMPARISON: 11/8/2023     FINDINGS/IMPRESSION: 1252 hours Post reduction anterior shoulder joint dislocation. No gross fracture demonstrated. Electronically Signed by: FEI BENAVIDEZ M.D. Signed on: 11/8/2023 1:09 PM Workstation ID: [id number]    XR SHOULDER 2 VIEWS LEFT    Result Date: 11/8/2023  EXAM:  XR SHOULDER 2 VIEWS LEFT  CLINICAL INDICATION: Trauma, pain COMPARISON:  None     FINDINGS/IMPRESSION: Anterior shoulder joint dislocation. Question of subtle deformity superior lateral aspect of humeral head. Follow-up films post reduction recommended. Electronically Signed by: FEI BENAVIDEZ M.D. Signed on: 11/8/2023 11:30 AM Workstation ID: [id number]    XR CHEST AP OR PA    Result Date: 11/8/2023  EXAM:  XR CHEST AP OR PA  CLINICAL INDICATION: Syncope  COMPARISON: 2/3/2019     FINDINGS/IMPRESSION: Prominent heart size. No mediastinal widening. No lung consolidation. No effusions or cephalization of vessels. No pneumothorax. Electronically Signed by: FEI BENAVIDEZ M.D. Signed on: 11/8/2023 11:26 AM Workstation ID: [id number]    CT CERVICAL SPINE WO CONTRAST    Result Date: 11/8/2023  CT cervical spine without contrast Clinical Indication: Neck injury Comparison: None. Technique: Multiplanar noncontrast CT of the cervical spine was performed. Automated exposure control employed as radiation dose reduction strategy on this patient. Findings: Normal cervical lordosis is preserved. Trace anterolisthesis of C3 on C4. Sagittal alignment and vertebral body heights are otherwise maintained. There is no evidence of fracture, disc disruption or facet dislocation. No aggressive osseous lesions. Mild multilevel degenerative changes of the cervical spine with multilevel posterior disc osteophyte complexes and uncovertebral joint hypertrophy. No high-grade spinal or neural foraminal narrowing. Prevertebral soft tissues are unremarkable.     Impression: No evidence of cervical spine fracture. Electronically Signed by: ANA MARIA AJ M.D. Signed on: 11/8/2023 11:03 AM Workstation ID: [id number]         A/P:  Moo is a 68 year old male presenting  with a past medical history significant for uncontrolled diabetes mellitus type 2 with a hemoglobin A1c of 9%, hypertensive heart disease with chronic diastolic  HFrEF 20%, morbid obesity, dyslipidemia, KAYLA, ulcerative colitis, COPD who presents status post syncopal episode with acute decompensated diastolic HFrEF d/t  to poor compliance  -Echocardiogram showing severely reduced EF 20%, diffuse severe hypokinesis, akinesis of the mid/distal anterolateral wall LV function appears to have worsened  -will start patient on CHF order set and start Lasix 80 mg IV twice daily we will hold today on 11/11/2023 due to hypotension per  heart failure team   -Hold Entresto due to MAXIMUS  -BNP 07860  -Troponin peaked at 124, EKG shows sinus rhythm  -Right heart cath deferred today because patient dislocated shoulder and timing did not work out between getting Ortho to reduce dislocation and the timing for the right heart cath  #Atrial tachycardia with intermittent sinus tachycardia with 2 episodes of NSVT  -I initiated discussion regarding AICD especially if EF remains less than 20% on repeat echo  -EP evaluated patient and recommending AICD for prevention of sudden cardiac death from arrhythmias planned during this hospitalization once optimized from heart failure standpoint     #hypomagnesemia, resolved  #Type 2 MI due to demand ischemia, POA  #MAXIMUS  -Creatinine 2.45> 2.59> 2.95>3.09>3.22     -Renal ultrasound showing mild nonspecific dilatation of the bilateral renal collecting systems nonspecific right perinephric fluid and bilateral renal cysts  -Nephrology consulted recommending aggressive diuresis however right now cardiology holding diuresis due to hypotension  #Poorly controlled diabetes mellitus type 2 with diabetic neuropathy with an A1c of 9.1%  -We will cover with sliding scale   #Left shoulder dislocation  -Patient had initial reduction in the ED and it redislocated on the floor and had a second reduction performed by the Ortho team on the floor with a left shoulder immobilizer in place and no AP ROM  Right eye conjunctivitis  We will start moxifloxacin drops x5 days  #Dyslipidemia  -LDL of 127, goal of less than 70 and continue on Crestor-there may be a compliance issue  #COPD continue Breo and Singulair  #morbid obesity BMI of 37.6  - weight los counseled  PT/OT evaluation  Patient states that he will be moving into assisted living facility that has been facilitated by his brother and he is supposed to be moving next week     Resume  diuresis today, monitor renal function, PT/OT is recommending subacute rehab.  Social work for dispo  planning and also planning AICD during this hospitalization once heart failure stabilized  Right heart cath on 11/14/2020      DVT Prophylaxis   subcu heparin    Code Status  Code Status Information     Code Status    Full Resuscitation           PCP  Eduardo Olivas MD Azmina Bhaiji, MD  11/13/2023 1:13 PM           Available

## 2024-12-17 NOTE — PATIENT PROFILE OB - BABY A: CORD CHARACTERISTICS, DELIVERY
[de-identified] : Patient is here for right quad pain that began in 8/2024, not due to injury. 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 Experiences numbness post playing soccer. Pain is located above knee, and will radiate with activity. Went to [facility] urgent care, got XR - quad strain. Attending PT at [facility]. Notes no improvement. Rested for 1 week, and returned to play with discomfort. Not running track only participating in travel soccer.  no anomalies noted

## 2025-02-09 ENCOUNTER — INPATIENT (INPATIENT)
Facility: HOSPITAL | Age: 35
LOS: 1 days | Discharge: HOME | End: 2025-02-11
Attending: SPECIALIST | Admitting: SPECIALIST

## 2025-02-09 VITALS — HEART RATE: 114 BPM | SYSTOLIC BLOOD PRESSURE: 128 MMHG | DIASTOLIC BLOOD PRESSURE: 77 MMHG

## 2025-02-09 DIAGNOSIS — O26.899 OTHER SPECIFIED PREGNANCY RELATED CONDITIONS, UNSPECIFIED TRIMESTER: ICD-10-CM

## 2025-02-09 PROBLEM — Z00.00 ENCOUNTER FOR PREVENTIVE HEALTH EXAMINATION: Status: ACTIVE | Noted: 2025-02-09

## 2025-02-09 LAB
BASOPHILS # BLD AUTO: 0.05 K/UL — SIGNIFICANT CHANGE UP (ref 0–0.2)
BASOPHILS NFR BLD AUTO: 0.4 % — SIGNIFICANT CHANGE UP (ref 0–2)
BLD GP AB SCN SERPL QL: NEGATIVE — SIGNIFICANT CHANGE UP
EOSINOPHIL # BLD AUTO: 0.2 K/UL — SIGNIFICANT CHANGE UP (ref 0–0.5)
EOSINOPHIL NFR BLD AUTO: 1.5 % — SIGNIFICANT CHANGE UP (ref 0–6)
GLUCOSE BLDC GLUCOMTR-MCNC: 78 MG/DL — SIGNIFICANT CHANGE UP (ref 70–99)
HCT VFR BLD CALC: 41.7 % — SIGNIFICANT CHANGE UP (ref 34.5–45)
HGB BLD-MCNC: 13 G/DL — SIGNIFICANT CHANGE UP (ref 11.5–15.5)
HIV 1+2 AB+HIV1 P24 AG SERPL QL IA: SIGNIFICANT CHANGE UP
IANC: 9.27 K/UL — HIGH (ref 1.8–7.4)
IMM GRANULOCYTES NFR BLD AUTO: 0.5 % — SIGNIFICANT CHANGE UP (ref 0–0.9)
LYMPHOCYTES # BLD AUTO: 18.2 % — SIGNIFICANT CHANGE UP (ref 13–44)
LYMPHOCYTES # BLD AUTO: 2.37 K/UL — SIGNIFICANT CHANGE UP (ref 1–3.3)
MCHC RBC-ENTMCNC: 23.5 PG — LOW (ref 27–34)
MCHC RBC-ENTMCNC: 31.2 G/DL — LOW (ref 32–36)
MCV RBC AUTO: 75.3 FL — LOW (ref 80–100)
MONOCYTES # BLD AUTO: 1.04 K/UL — HIGH (ref 0–0.9)
MONOCYTES NFR BLD AUTO: 8 % — SIGNIFICANT CHANGE UP (ref 2–14)
NEUTROPHILS # BLD AUTO: 9.27 K/UL — HIGH (ref 1.8–7.4)
NEUTROPHILS NFR BLD AUTO: 71.4 % — SIGNIFICANT CHANGE UP (ref 43–77)
NRBC # BLD AUTO: 0 K/UL — SIGNIFICANT CHANGE UP (ref 0–0)
NRBC # BLD: 0 /100 WBCS — SIGNIFICANT CHANGE UP (ref 0–0)
NRBC # FLD: 0 K/UL — SIGNIFICANT CHANGE UP (ref 0–0)
NRBC BLD-RTO: 0 /100 WBCS — SIGNIFICANT CHANGE UP (ref 0–0)
PLATELET # BLD AUTO: 327 K/UL — SIGNIFICANT CHANGE UP (ref 150–400)
RBC # BLD: 5.54 M/UL — HIGH (ref 3.8–5.2)
RBC # FLD: 15 % — HIGH (ref 10.3–14.5)
RH IG SCN BLD-IMP: POSITIVE — SIGNIFICANT CHANGE UP
RH IG SCN BLD-IMP: POSITIVE — SIGNIFICANT CHANGE UP
WBC # BLD: 13 K/UL — HIGH (ref 3.8–10.5)
WBC # FLD AUTO: 13 K/UL — HIGH (ref 3.8–10.5)

## 2025-02-09 RX ORDER — SODIUM CHLORIDE 9 G/ML
1000 INJECTION, SOLUTION INTRAVENOUS
Refills: 0 | Status: DISCONTINUED | OUTPATIENT
Start: 2025-02-09 | End: 2025-02-09

## 2025-02-09 RX ORDER — SODIUM CHLORIDE 9 G/ML
1000 INJECTION, SOLUTION INTRAVENOUS
Refills: 0 | Status: DISCONTINUED | OUTPATIENT
Start: 2025-02-09 | End: 2025-02-10

## 2025-02-09 RX ORDER — OXYTOCIN/0.9 % SODIUM CHLORIDE 10/500ML
167 PLASTIC BAG, INJECTION (ML) INTRAVENOUS
Qty: 30 | Refills: 0 | Status: DISCONTINUED | OUTPATIENT
Start: 2025-02-09 | End: 2025-02-10

## 2025-02-09 RX ORDER — OXYCODONE HYDROCHLORIDE 30 MG/1
5 TABLET ORAL
Refills: 0 | Status: DISCONTINUED | OUTPATIENT
Start: 2025-02-09 | End: 2025-02-11

## 2025-02-09 RX ORDER — OXYTOCIN/0.9 % SODIUM CHLORIDE 10/500ML
2 PLASTIC BAG, INJECTION (ML) INTRAVENOUS
Qty: 30 | Refills: 0 | Status: DISCONTINUED | OUTPATIENT
Start: 2025-02-09 | End: 2025-02-10

## 2025-02-09 RX ORDER — PNV WITH CALCIUM NO.11/IRON/FA 65 MG-1 MG
1 TABLET ORAL DAILY
Refills: 0 | Status: DISCONTINUED | OUTPATIENT
Start: 2025-02-09 | End: 2025-02-11

## 2025-02-09 RX ORDER — OXYTOCIN/0.9 % SODIUM CHLORIDE 10/500ML
PLASTIC BAG, INJECTION (ML) INTRAVENOUS
Qty: 30 | Refills: 0 | Status: DISCONTINUED | OUTPATIENT
Start: 2025-02-09 | End: 2025-02-09

## 2025-02-09 RX ORDER — WITCH HAZEL 86 ML/100ML
1 OIL ORAL; TOPICAL EVERY 4 HOURS
Refills: 0 | Status: DISCONTINUED | OUTPATIENT
Start: 2025-02-09 | End: 2025-02-11

## 2025-02-09 RX ORDER — DIPHENHYDRAMINE HCL 25 MG
25 CAPSULE ORAL EVERY 6 HOURS
Refills: 0 | Status: COMPLETED | OUTPATIENT
Start: 2025-02-09 | End: 2026-01-08

## 2025-02-09 RX ORDER — BACTERIOSTATIC SODIUM CHLORIDE 0.9 %
3 VIAL (ML) INJECTION EVERY 8 HOURS
Refills: 0 | Status: DISCONTINUED | OUTPATIENT
Start: 2025-02-09 | End: 2025-02-11

## 2025-02-09 RX ORDER — ACETAMINOPHEN 160 MG/5ML
975 SUSPENSION ORAL
Refills: 0 | Status: DISCONTINUED | OUTPATIENT
Start: 2025-02-09 | End: 2025-02-11

## 2025-02-09 RX ORDER — KETOROLAC TROMETHAMINE 10 MG
30 TABLET ORAL ONCE
Refills: 0 | Status: DISCONTINUED | OUTPATIENT
Start: 2025-02-09 | End: 2025-02-09

## 2025-02-09 RX ORDER — ANTISEPTIC SURGICAL SCRUB 0.04 MG/ML
1 SOLUTION TOPICAL DAILY
Refills: 0 | Status: DISCONTINUED | OUTPATIENT
Start: 2025-02-09 | End: 2025-02-09

## 2025-02-09 RX ORDER — CLOSTRIDIUM TETANI TOXOID ANTIGEN (FORMALDEHYDE INACTIVATED), CORYNEBACTERIUM DIPHTHERIAE TOXOID ANTIGEN (FORMALDEHYDE INACTIVATED), BORDETELLA PERTUSSIS TOXOID ANTIGEN (GLUTARALDEHYDE INACTIVATED), BORDETELLA PERTUSSIS FILAMENTOUS HEMAGGLUTININ ANTIGEN (FORMALDEHYDE INACTIVATED), BORDETELLA PERTUSSIS PERTACTIN ANTIGEN, AND BORDETELLA PERTUSSIS FIMBRIAE 2/3 ANTIGEN 5; 2; 2.5; 5; 3; 5 [LF]/.5ML; [LF]/.5ML; UG/.5ML; UG/.5ML; UG/.5ML; UG/.5ML
0.5 INJECTION, SUSPENSION INTRAMUSCULAR ONCE
Refills: 0 | Status: DISCONTINUED | OUTPATIENT
Start: 2025-02-09 | End: 2025-02-11

## 2025-02-09 RX ORDER — OXYCODONE HYDROCHLORIDE 30 MG/1
5 TABLET ORAL ONCE
Refills: 0 | Status: DISCONTINUED | OUTPATIENT
Start: 2025-02-09 | End: 2025-02-11

## 2025-02-09 RX ORDER — SODIUM CITRATE AND CITRIC ACID MONOHYDRATE 1002; 1500 MG/15ML; MG/15ML
15 SOLUTION ORAL EVERY 6 HOURS
Refills: 0 | Status: DISCONTINUED | OUTPATIENT
Start: 2025-02-09 | End: 2025-02-09

## 2025-02-09 RX ORDER — PRAMOXINE HCL 1 %
1 CREAM (GRAM) RECTAL EVERY 4 HOURS
Refills: 0 | Status: DISCONTINUED | OUTPATIENT
Start: 2025-02-09 | End: 2025-02-11

## 2025-02-09 RX ORDER — MAGNESIUM HYDROXIDE 400 MG/5ML
30 SUSPENSION, ORAL (FINAL DOSE FORM) ORAL
Refills: 0 | Status: DISCONTINUED | OUTPATIENT
Start: 2025-02-09 | End: 2025-02-11

## 2025-02-09 RX ORDER — HYDROCORTISONE 1 %
1 CREAM (GRAM) TOPICAL EVERY 6 HOURS
Refills: 0 | Status: DISCONTINUED | OUTPATIENT
Start: 2025-02-09 | End: 2025-02-11

## 2025-02-09 RX ORDER — IBUPROFEN 600 MG/1
600 TABLET, FILM COATED ORAL EVERY 6 HOURS
Refills: 0 | Status: COMPLETED | OUTPATIENT
Start: 2025-02-09 | End: 2026-01-08

## 2025-02-09 RX ORDER — ACETAMINOPHEN 160 MG/5ML
1000 SUSPENSION ORAL ONCE
Refills: 0 | Status: DISCONTINUED | OUTPATIENT
Start: 2025-02-09 | End: 2025-02-10

## 2025-02-09 RX ADMIN — Medication 30 MILLIGRAM(S): at 22:22

## 2025-02-09 RX ADMIN — Medication 2 MILLIUNIT(S)/MIN: at 20:01

## 2025-02-09 RX ADMIN — Medication 167 MILLIUNIT(S)/MIN: at 22:22

## 2025-02-09 RX ADMIN — Medication 30 MILLIGRAM(S): at 21:06

## 2025-02-09 RX ADMIN — SODIUM CHLORIDE 125 MILLILITER(S): 9 INJECTION, SOLUTION INTRAVENOUS at 20:02

## 2025-02-09 RX ADMIN — ACETAMINOPHEN 975 MILLIGRAM(S): 160 SUSPENSION ORAL at 23:29

## 2025-02-09 NOTE — OB RN DELIVERY SUMMARY - BABY A: WEIGHT IN POUNDS (FROM GRAMS), DELIVERY
Cosentyx Counseling:  I discussed with the patient the risks of Cosentyx including but not limited to worsening of Crohn's disease, immunosuppression, allergic reactions and infections.  The patient understands that monitoring is required including a PPD at baseline and must alert us or the primary physician if symptoms of infection or other concerning signs are noted. 6

## 2025-02-09 NOTE — OB PROVIDER H&P - ASSESSMENT
1500  discussed with Dr Dane Vela called pt on cell phone, pt states she is grady every 15 minutes and does not need any epidural and " is okay"  plan to recheck SVE in 2 hours    1700  SVE rechecked 6.5/80/-3  Pt states she is more uncomfortable contractions q 6 minutes and desires epidural  d/w Dr Vela   admit for epidural  routine orders      Risks, benefits, alternatives, and possible complications have been discussed in detail with the patient in her native language. Pre-admission, admission, and post admission procedures and expectations were discussed in detail. All questions answered, all appropriate hospital consents were signed. Anticipate normal vaginal delivery.    Informed consent was obtained. The following was discussed:    - Induction/augmentation of labor: use of medication and/or cook balloon to begin or enhance labor    - Obstetrical management including internal fetal/contraction monitoring    - Normal vaginal delivery    - Possible  section

## 2025-02-09 NOTE — OB RN PATIENT PROFILE - 'COMMUNITY AGENCIES/SUPPORT GROUPS, OB PROFILE
"Judie Munoz  05/24/2017  [id number]    Teresa Flores MD  Patient Care Team:  Teresa Flores MD as PCP - General (Family Medicine)  Has the patient seen any provider outside of the [facility] network since the last visit? (yes). If yes, HIPPA forms completed and records requested.    Bart Widden at [facility]- Neurologist. Could not get in this am, and left message with nurse.  Last seen in Dec    Visit Type:an urgent visit for a new problem    Chief Complaint:  Chief Complaint   Patient presents with    Headache    Nausea       History of Present Illness:  37 year old here complaining on Migraine/vision changes.   PCP is listed as Dr. Flores. She has seen Dr. Guerrero at [facility] for care as well.   Chart review shows that she has history of migraines. She has not had any imaging per her reports.  She is on Firociet and Zomig.  She reports she had been on Topamax in past, which she reports she stopped because of anxiety and poor sleep.   She was switched to Effexor. She started that in December.     She reports headache started Monday (2 days ago) this week. She reports she doesn't feel a pain. She feels a "twitching". Behind both eyes and temporal. She feels a squeeze or pressure. She denies any tearing. She feels the twitching is both side.  She feels the pressure in the back. She reports associated with nausea. No vomiting.  She has tried Fioricet. She has not tried any Zomig.     She does have sensitively to the light. She reports vision changes, she sees black floating spots.   She reports even with glasses on she has a hard time with focus. That is both eyes.       She admits to working more hours at work, over the last week. She reports that is only contributor she can think of that caused this.  No other focal deficits noted.     She normally wears glasses, but she is suppose to wear all the time. She reports last eye exam was 3 months ago with her eye doctor, [facility] [place]. Optometrist. "           History:  Past Medical History:   Diagnosis Date    Hypertension      Past Surgical History:   Procedure Laterality Date    D&C x 3       miscarriages     Family History   Problem Relation Age of Onset    Heart disease Mother     Hypertension Mother     Diabetes Father     Cancer Maternal Aunt     Diabetes Paternal Aunt     Diabetes Maternal Grandmother     Heart disease Maternal Grandmother     Kidney disease Maternal Grandmother      Social History     Social History    Marital status: Single     Spouse name: N/A    Number of children: N/A    Years of education: N/A     Occupational History     [employer]     Social History Main Topics    Smoking status: Never Smoker    Smokeless tobacco: Never Used    Alcohol use No    Drug use: No    Sexual activity: Not Currently     Partners: Male     Other Topics Concern    Not on file     Social History Narrative    No narrative on file     Patient Active Problem List   Diagnosis    Hypertension    Migraines     Review of patient's allergies indicates:  No Known Allergies    The following were reviewed at this visit: active problem list, medication list, allergies, family history, social history, and health maintenance.    Medications:  Current Outpatient Prescriptions on File Prior to Visit   Medication Sig Dispense Refill    butalbital-acetaminophen-caffeine -40 mg (FIORICET, ESGIC) -40 mg per tablet every 6 (six) hours as needed.  0    POP-BE 0.35 mg tablet TK 1 T PO D  11    zolmitriptan (ZOMIG-ZMT) 5 MG disintegrating tablet Take 1 tablet (5 mg total) by mouth as needed. 6 tablet 2    [DISCONTINUED] methylPREDNISolone (MEDROL DOSEPACK) 4 mg tablet Take 4 mg by mouth. use as directed      [DISCONTINUED] VENLAFAXINE HCL (VENLAFAXINE ORAL) Take by mouth.       No current facility-administered medications on file prior to visit.        Medications have been reviewed and reconciled with patient at this  visit.  Barriers to medications present (no)    Adverse reactions to current medications (no)    Over the counter medications reviewed (Yes ), and if needed added to active Medication list at this visit.     Exam:  Wt Readings from Last 3 Encounters:   05/24/17 100.5 kg (221 lb 9 oz)   02/17/17 95.4 kg (210 lb 5.1 oz)   02/19/16 101.3 kg (223 lb 5.2 oz)     Temp Readings from Last 3 Encounters:   05/24/17 98.3 °F (36.8 °C) (Tympanic)   02/17/17 97.7 °F (36.5 °C) (Tympanic)   02/19/16 98.6 °F (37 °C) (Tympanic)     BP Readings from Last 3 Encounters:   05/24/17 126/78   02/17/17 (!) 140/90   02/19/16 122/74     Pulse Readings from Last 3 Encounters:   05/24/17 96   02/17/17 102   02/19/16 88     Body mass index is 34.7 kg/m².      Review of Systems   Constitutional: Negative.  Negative for chills and fever.   HENT: Negative for ear pain, hearing loss and sore throat.    Eyes: Positive for blurred vision. Negative for double vision, discharge and redness.   Respiratory: Negative for cough and shortness of breath.    Cardiovascular: Negative for chest pain, palpitations and leg swelling.   Gastrointestinal: Negative for abdominal pain, constipation, diarrhea and heartburn.   Musculoskeletal: Negative for back pain and neck pain.   Skin: Negative for itching and rash.   Neurological: Positive for headaches. Negative for dizziness, sensory change, speech change and focal weakness.        Atypical Headache     Endo/Heme/Allergies: Negative.  Negative for polydipsia. Does not bruise/bleed easily.   Psychiatric/Behavioral: Negative for depression. The patient is not nervous/anxious.        Physical Exam   Constitutional: She is oriented to person, place, and time. She appears well-developed and well-nourished.   HENT:   Head: Normocephalic and atraumatic.   Right Ear: External ear normal.   Left Ear: External ear normal.   Nose: Nose normal.   Mouth/Throat: Oropharynx is clear and moist. No oropharyngeal exudate.   Eyes:  Conjunctivae and EOM are normal. Pupils are equal, round, and reactive to light. Right eye exhibits no discharge. Left eye exhibits no discharge. No scleral icterus.   Neck: Normal range of motion. Neck supple. No thyromegaly present.   Cardiovascular: Normal rate, regular rhythm, normal heart sounds and intact distal pulses.    No murmur heard.  Pulmonary/Chest: Effort normal and breath sounds normal. No respiratory distress.   Abdominal: Soft. Bowel sounds are normal.   Musculoskeletal: Normal range of motion.   Lymphadenopathy:     She has no cervical adenopathy.   Neurological: She is alert and oriented to person, place, and time. She has normal strength and normal reflexes. She displays normal reflexes. No cranial nerve deficit or sensory deficit. She exhibits normal muscle tone. Coordination and gait normal.   Reflex Scores:       Tricep reflexes are 2+ on the right side and 2+ on the left side.       Bicep reflexes are 2+ on the right side and 2+ on the left side.       Patellar reflexes are 2+ on the right side and 2+ on the left side.       Achilles reflexes are 2+ on the right side and 2+ on the left side.  Full ROM of neck  No tongue deviation  Finger to nose normal  Heel to shin test normal  Pupil Equal, could not see optic disc. Some vessels seen, normal in appearance.         Laboratory Reviewed ({Yes)  Lab Results   Component Value Date    WBC 9.38 04/24/2017    HGB 13.1 04/24/2017    HCT 40.5 04/24/2017     04/24/2017    CHOL 164 04/24/2017    TRIG 68 04/24/2017    HDL 48 04/24/2017    ALT 11 04/24/2017    AST 15 04/24/2017     04/24/2017    K 3.8 04/24/2017     04/24/2017    CREATININE 0.8 04/24/2017    BUN 12 04/24/2017    CO2 27 04/24/2017    TSH 1.099 04/24/2017    HGBA1C 5.8 04/24/2017       Assessment:  The primary encounter diagnosis was Intractable migraine without aura and without status migrainosus. Diagnoses of Eye strain, bilateral and Changes in vision were also  pertinent to this visit.     Andrew MACIAS was seen today for headache and nausea.    Diagnoses and all orders for this visit:    Intractable migraine without aura and without status migrainosus  -     Ambulatory referral to Optometry    Eye strain, bilateral  -     Ambulatory referral to Optometry    Changes in vision  -     Ambulatory referral to Optometry    Exam is benign  Due to vision changes, will sent Optometry for eye exam due to floaters and black spots.   I believe symptoms are due to eye strain, working long hours in front of computer over last week has triggered  Advised of 2 days of rest and discussed need for taking breaks to prevent strain.    Care Plan/Goals: Reviewed  (Yes)  Goals     None          Follow up: Return if symptoms worsen or fail to improve.    After visit summary was printed and given to patient upon discharge today.  Patient goals and care plan are included in After Visit Summary.   [facility] (W[facility])

## 2025-02-09 NOTE — OB PROVIDER DELIVERY SUMMARY - NSLOWPPHRISK_OBGYN_A_OB
No previous uterine incision/Mccrary Pregnancy/No known bleeding disorder/No history of postpartum hemorrhage/No other PPH risks indicated

## 2025-02-09 NOTE — OB RN DELIVERY SUMMARY - NSSELHIDDEN_OBGYN_ALL_OB_FT
[NS_DeliveryAttending1_OBGYN_ALL_OB_FT:[id number]],[NS_DeliveryRN_OBGYN_ALL_OB_FT:[id number]=]

## 2025-02-09 NOTE — OB RN TRIAGE NOTE - FALL HARM RISK - UNIVERSAL INTERVENTIONS
Bed in lowest position, wheels locked, appropriate side rails in place/Call bell, personal items and telephone in reach/Instruct patient to call for assistance before getting out of bed or chair/Non-slip footwear when patient is out of bed/[place] to call system/Physically safe environment - no spills, clutter or unnecessary equipment/Purposeful Proactive Rounding/Room/bathroom lighting operational, light cord in reach

## 2025-02-09 NOTE — OB RN DELIVERY SUMMARY - NS_SEPSISRSKCALC_OBGYN_ALL_OB_FT
EOS calculated successfully. EOS Risk Factor: 0.5/1000 live births ([facility] national incidence); GA=37w6d; Temp=98.6; ROM=2.317; GBS='Unknown'; Antibiotics='No antibiotics or any antibiotics < 2 hrs prior to birth'

## 2025-02-09 NOTE — OB PROVIDER DELIVERY SUMMARY - NSPROVIDERDELIVERYNOTE_OBGYN_ALL_OB_FT
of    aviable female    clear fluid   Apgars 8.9   CAN x1 loose   no apparent anomaly   placenta intct   Rectum vagina cx intact  hemorrhoid noted      mlother and baby  stable

## 2025-02-09 NOTE — OB PROVIDER H&P - NSHPPHYSICALEXAM_GEN_ALL_CORE
Vital Signs Last 24 Hrs  T(C): 36.6 (09 Feb 2025 14:14), Max: 36.6 (09 Feb 2025 14:07)  T(F): 97.9 (09 Feb 2025 14:14), Max: 97.9 (09 Feb 2025 14:14)  HR: 97 (09 Feb 2025 14:47) (97 - 114)  BP: 131/84 (09 Feb 2025 14:47) (119/74 - 131/84)  BP(mean): --  RR: 16 (09 Feb 2025 14:14) (16 - 16)  SpO2: --    A&O x3  CTAB  abdomen: gravid, soft, nontender  sve 6.5/100/-2   baseline, moderate variability + accels, no decels, moderate contractions q 6 minutes, reactive NST   TAS: vtx, anterior placenta, mvp 3.26 m mode 140 bpm, images saved in ASOB

## 2025-02-09 NOTE — OB NEONATOLOGY/PEDIATRICIAN DELIVERY SUMMARY - NSPEDSNEONOTESA_OBGYN_ALL_OB_FT
Peds called to LDR for cat 2 tracing. Baby is a 37.6 wk AGA  female born via  to a 35 y/o  mother.  Maternal medical/surgical/pregnancy history of GDMA1. Maternal labs include Blood Type A+, HIV -. All other labs pending. ROM with clear (ROM hours: approx 2hrs). Highest maternal temp: 36.8C. EOS 0.11. Baby emerged vigorous, crying, was warmed, dried, suctioned, and stimulated with APGARS of 8/8. Resuscitation included: bulb suction and stimulation. Mom plans to initiate breastfeeding , consents Hep B vaccine.    : 25  TOB: 1851  BW: 2820 g     Physical Exam:  Gen: no acute distress, +grimace  HEENT:  anterior fontanel open soft and flat, nondysmorphic facies, no cleft lip/palate, ears normal set, no ear pits or tags, nares clinically patent  Resp: Normal respiratory effort without grunting or retractions, good air entry b/l, clear to auscultation bilaterally  Cardio: Present S1/S2, regular rate and rhythm, no murmurs  Abd: soft, non tender, non distended, umbilical cord with 3 vessels  Neuro: +palmar and plantar grasp, +suck, +rosemary, normal tone  Extremities: negative moffett and ortolani maneuvers, moving all extremities, no clavicular crepitus or stepoff  Skin: pink, warm  Genitals:[Normal female anatomyTanner 1, anus patent

## 2025-02-09 NOTE — OB PROVIDER H&P - NSLOWPPHRISK_OBGYN_A_OB
No previous uterine incision/Mccrary Pregnancy/Less than or equal to 4 previous vaginal births/No known bleeding disorder/No history of postpartum hemorrhage/No other PPH risks indicated No previous uterine incision/Mccrary Pregnancy/No known bleeding disorder/No history of postpartum hemorrhage/No other PPH risks indicated

## 2025-02-09 NOTE — OB PROVIDER TRIAGE NOTE - NSOBPROVIDERNOTE_OBGYN_ALL_OB_FT
1500  discussed with Dr Dane Vela called pt on cell phone, pt states she is grady every 15 minutes and does not need any epidural and " is okay"  plan to recheck SVE in 2 hours 1500  discussed with Dr Dane Vela called pt on cell phone, pt states she is grady every 15 minutes and does not need any epidural and " is okay"  plan to recheck SVE in 2 hours    1700  SVE rechecked 6.5/80/-3  Pt states she is more uncomfortable contractions q 6 minutes and desires epidural  d/w Dr Vela   admit for epidural  routine orders

## 2025-02-09 NOTE — OB RN PATIENT PROFILE - FALL HARM RISK - UNIVERSAL INTERVENTIONS
Bed in lowest position, wheels locked, appropriate side rails in place/Call bell, personal items and telephone in reach/Instruct patient to call for assistance before getting out of bed or chair/Non-slip footwear when patient is out of bed/[place] to call system/Physically safe environment - no spills, clutter or unnecessary equipment/Purposeful Proactive Rounding/Room/bathroom lighting operational, light cord in reach

## 2025-02-09 NOTE — OB PROVIDER H&P - HISTORY OF PRESENT ILLNESS
[id number] Allen ( [facility])    This is a 34 year old  at 37.6 weeks gestational age presents with complaints of painful contractions q 5 minutes and requesting epidural. Pt states she was 3cm yesterday at Dr Martinez office. Pt reports +GFM, denies LOF or VB.     PNC: DR Dane ROY course   - GDMA1, well controlled as per patient   -

## 2025-02-09 NOTE — OB PROVIDER TRIAGE NOTE - HISTORY OF PRESENT ILLNESS
[id number] Allen ( [facility])    This is a 34 year old  at 37.6 weeks gestational age presents with complaints of painful contractions q 5 minutes and requesting epidural. Pt states she was 3cm yesterday at Dr Martinez office. Pt reports +GFM, denies LOF or VB.     PNC: DR Dane ROY course   - GDMA1, well controlled as per patient   -

## 2025-02-10 LAB
HBV SURFACE AG SERPL QL IA: SIGNIFICANT CHANGE UP
HCT VFR BLD CALC: 34.9 % — SIGNIFICANT CHANGE UP (ref 34.5–45)
HCV AB S/CO SERPL IA: 0.09 S/CO — SIGNIFICANT CHANGE UP (ref 0–0.99)
HCV AB SERPL-IMP: SIGNIFICANT CHANGE UP
HGB BLD-MCNC: 11.4 G/DL — LOW (ref 11.5–15.5)
RUBV IGG SER-ACNC: 6.43 INDEX — SIGNIFICANT CHANGE UP
RUBV IGG SER-IMP: POSITIVE — SIGNIFICANT CHANGE UP
T PALLIDUM AB TITR SER: NEGATIVE — SIGNIFICANT CHANGE UP

## 2025-02-10 RX ORDER — IBUPROFEN 600 MG/1
1 TABLET, FILM COATED ORAL
Qty: 0 | Refills: 0 | DISCHARGE
Start: 2025-02-10

## 2025-02-10 RX ORDER — DIPHENHYDRAMINE HCL 25 MG
25 CAPSULE ORAL EVERY 6 HOURS
Refills: 0 | Status: DISCONTINUED | OUTPATIENT
Start: 2025-02-10 | End: 2025-02-11

## 2025-02-10 RX ORDER — PNV WITH CALCIUM NO.11/IRON/FA 65 MG-1 MG
0 TABLET ORAL
Refills: 0 | DISCHARGE

## 2025-02-10 RX ORDER — ACETAMINOPHEN 160 MG/5ML
3 SUSPENSION ORAL
Qty: 0 | Refills: 0 | DISCHARGE
Start: 2025-02-10

## 2025-02-10 RX ORDER — IBUPROFEN 600 MG/1
600 TABLET, FILM COATED ORAL EVERY 6 HOURS
Refills: 0 | Status: DISCONTINUED | OUTPATIENT
Start: 2025-02-10 | End: 2025-02-11

## 2025-02-10 RX ORDER — FOLIC ACID 1 MG
0 TABLET ORAL
Refills: 0 | DISCHARGE

## 2025-02-10 RX ADMIN — Medication 3 MILLILITER(S): at 22:56

## 2025-02-10 RX ADMIN — OXYCODONE HYDROCHLORIDE 5 MILLIGRAM(S): 30 TABLET ORAL at 03:12

## 2025-02-10 RX ADMIN — ACETAMINOPHEN 975 MILLIGRAM(S): 160 SUSPENSION ORAL at 15:25

## 2025-02-10 RX ADMIN — Medication 25 MILLIGRAM(S): at 18:14

## 2025-02-10 RX ADMIN — ACETAMINOPHEN 975 MILLIGRAM(S): 160 SUSPENSION ORAL at 08:46

## 2025-02-10 RX ADMIN — Medication 3 MILLILITER(S): at 14:00

## 2025-02-10 RX ADMIN — IBUPROFEN 600 MILLIGRAM(S): 600 TABLET, FILM COATED ORAL at 18:44

## 2025-02-10 RX ADMIN — IBUPROFEN 600 MILLIGRAM(S): 600 TABLET, FILM COATED ORAL at 12:05

## 2025-02-10 RX ADMIN — IBUPROFEN 600 MILLIGRAM(S): 600 TABLET, FILM COATED ORAL at 18:13

## 2025-02-10 RX ADMIN — Medication 3 MILLILITER(S): at 05:01

## 2025-02-10 RX ADMIN — IBUPROFEN 600 MILLIGRAM(S): 600 TABLET, FILM COATED ORAL at 05:08

## 2025-02-10 RX ADMIN — Medication 0.1 MILLIGRAM(S): at 02:36

## 2025-02-10 RX ADMIN — ACETAMINOPHEN 975 MILLIGRAM(S): 160 SUSPENSION ORAL at 20:33

## 2025-02-10 RX ADMIN — OXYCODONE HYDROCHLORIDE 5 MILLIGRAM(S): 30 TABLET ORAL at 02:12

## 2025-02-10 RX ADMIN — ACETAMINOPHEN 975 MILLIGRAM(S): 160 SUSPENSION ORAL at 09:20

## 2025-02-10 RX ADMIN — Medication 0.1 MILLIGRAM(S): at 03:36

## 2025-02-10 RX ADMIN — Medication 25 MILLIGRAM(S): at 00:20

## 2025-02-10 RX ADMIN — ACETAMINOPHEN 975 MILLIGRAM(S): 160 SUSPENSION ORAL at 18:43

## 2025-02-10 RX ADMIN — Medication 1 TABLET(S): at 12:05

## 2025-02-10 RX ADMIN — Medication 3 MILLILITER(S): at 01:01

## 2025-02-10 RX ADMIN — IBUPROFEN 600 MILLIGRAM(S): 600 TABLET, FILM COATED ORAL at 05:34

## 2025-02-10 RX ADMIN — ACETAMINOPHEN 975 MILLIGRAM(S): 160 SUSPENSION ORAL at 20:03

## 2025-02-10 RX ADMIN — ACETAMINOPHEN 975 MILLIGRAM(S): 160 SUSPENSION ORAL at 00:14

## 2025-02-10 RX ADMIN — IBUPROFEN 600 MILLIGRAM(S): 600 TABLET, FILM COATED ORAL at 23:35

## 2025-02-10 RX ADMIN — IBUPROFEN 600 MILLIGRAM(S): 600 TABLET, FILM COATED ORAL at 12:40

## 2025-02-11 ENCOUNTER — TRANSCRIPTION ENCOUNTER (OUTPATIENT)
Age: 35
End: 2025-02-11

## 2025-02-11 VITALS
TEMPERATURE: 98 F | OXYGEN SATURATION: 100 % | RESPIRATION RATE: 18 BRPM | DIASTOLIC BLOOD PRESSURE: 67 MMHG | HEART RATE: 72 BPM | SYSTOLIC BLOOD PRESSURE: 107 MMHG

## 2025-02-11 RX ADMIN — OXYCODONE HYDROCHLORIDE 5 MILLIGRAM(S): 30 TABLET ORAL at 02:10

## 2025-02-11 RX ADMIN — IBUPROFEN 600 MILLIGRAM(S): 600 TABLET, FILM COATED ORAL at 05:30

## 2025-02-11 RX ADMIN — IBUPROFEN 600 MILLIGRAM(S): 600 TABLET, FILM COATED ORAL at 06:00

## 2025-02-11 RX ADMIN — IBUPROFEN 600 MILLIGRAM(S): 600 TABLET, FILM COATED ORAL at 12:35

## 2025-02-11 RX ADMIN — Medication 1 TABLET(S): at 12:03

## 2025-02-11 RX ADMIN — IBUPROFEN 600 MILLIGRAM(S): 600 TABLET, FILM COATED ORAL at 00:05

## 2025-02-11 RX ADMIN — Medication 25 MILLIGRAM(S): at 05:30

## 2025-02-11 RX ADMIN — IBUPROFEN 600 MILLIGRAM(S): 600 TABLET, FILM COATED ORAL at 12:03

## 2025-02-11 RX ADMIN — ACETAMINOPHEN 975 MILLIGRAM(S): 160 SUSPENSION ORAL at 10:00

## 2025-02-11 RX ADMIN — OXYCODONE HYDROCHLORIDE 5 MILLIGRAM(S): 30 TABLET ORAL at 01:40

## 2025-02-11 RX ADMIN — ACETAMINOPHEN 975 MILLIGRAM(S): 160 SUSPENSION ORAL at 09:22

## 2025-02-11 RX ADMIN — Medication 3 MILLILITER(S): at 06:35

## 2025-02-11 NOTE — DISCHARGE NOTE OB - FINANCIAL ASSISTANCE
[facility] provides services at a reduced cost to those who are determined to be eligible through [facility]’s financial assistance program. Information regarding [facility]’s financial assistance program can be found by going to [email or web address] or by calling [phone].

## 2025-02-11 NOTE — DISCHARGE NOTE OB - CARE PROVIDER_API CALL
Romel Vela  Obstetrics and Gynecology  [address] [place] [address], [address]  [place] [address]  Phone: [phone]  Fax: [phone]  Follow Up Time:

## 2025-02-11 NOTE — DISCHARGE NOTE OB - SWOLLEN, PAINFUL HOT AREAS AND/OR STREAKS ON THE BREAST
ACP Sarah Ashley made aware
consider platelet transfusion
consider platelet transfusion?
ACP Natacha made aware
Tele Evans Rothman Amanda notified and made aware. Rhoda Rothman spoke to hematology, will continue with IGG IVPB in AM as ordered.
no recommendation at this time
TREVON Hill made aware
consider platelet transfusion, continue decadron
Consult with hematology
Evans Thomas Amanda notified and made aware. Rhoda Rothman ordered STAT 1 unit's of Platelet's.
plt transfusion
transfuse platelets per acp order
give plt transfusion
no recommendation at this time
Statement Selected

## 2025-02-11 NOTE — DISCHARGE NOTE OB - NS OB DC MMR REASON NOT RECEIVED
The patient had a well modified grand mal seizure under general anesthesia and a muscle relaxant.  The patient is alert, responsive, in no acute distress.  Recovery uneventful. 
Contraindicated

## 2025-02-11 NOTE — DISCHARGE NOTE OB - MATERIALS PROVIDED
[facility] [place] Screening Program/[place]  Immunization Record/Breastfeeding Log/Guide to Postpartum Care/[facility] Hearing Screen Program/Shaken Baby Prevention Handout/Breastfeeding Guide and Packet/Birth Certificate Instructions/Tdap Vaccination (VIS Pub Date: 2012)

## 2025-02-11 NOTE — DISCHARGE NOTE OB - PATIENT PORTAL LINK FT
You can access the FollowMyHealth Patient Portal offered by [facility] by registering at the following website: [email or web address]. By joining [employer]’s FollowMyHealth portal, you will also be able to view your health information using other applications (apps) compatible with our system.

## 2025-02-11 NOTE — DISCHARGE NOTE OB - MEDICATION SUMMARY - MEDICATIONS TO TAKE
I will START or STAY ON the medications listed below when I get home from the hospital:    ibuprofen 600 mg oral tablet  -- 1 tab(s) by mouth every 6 hours  -- Indication: For postpartum    acetaminophen 325 mg oral tablet  -- 3 tab(s) by mouth 4 times a day  -- Indication: For postpartum

## 2025-02-11 NOTE — PROGRESS NOTE ADULT - SUBJECTIVE AND OBJECTIVE BOX
S: Patient doing well.   Pain controlled.  +OOB.  +void.  Tolerating PO.  Lochia WNL.    O: Vital Signs Last 24 Hrs  T(C): 36.4 (10 Feb 2025 10:22), Max: 37 (2025 17:48)  T(F): 97.5 (10 Feb 2025 10:22), Max: 98.6 (2025 17:48)  HR: 89 (10 Feb 2025 10:22) (78 - 153)  BP: 122/54 (10 Feb 2025 10:22) (87/50 - 148/72)  BP(mean): --  RR: 18 (10 Feb 2025 10:22) (16 - 18)  SpO2: 100% (10 Feb 2025 10:22) (69% - 100%)    Parameters below as of 10 Feb 2025 10:22  Patient On (Oxygen Delivery Method): room air        Gen: NAD  Abd: soft, NT, ND.   fundus firm below umbilicus, NT.  Ext: no tenderness B/L    Labs:                        11.4   x     )-----------( x        ( 10 Feb 2025 06:06 )             34.9       ABO Interpretation: A ( @ 18:11)    Rh Interpretation: Positive ( @ 18:11)    Antibody Screen Negative      A: 34y PPD#1 s/p  doing well.   -Continue routine postpartum care.  -Discharge planning and instructions given. 
Assessment and Plan    Day  2  Vaginal Delivery  She feels well  Continue the current pain medication  Encourage  Ambulation  Encourage regular diet   DVT ppx: SCDs only when not ambulating  She is stable, tolerates a diet and has normal flatus and bowel movements  She will be discharged on Day 2 according to the normal criteria.        
patient checked    Has Epidural   SROM  done   clear fluid    Pitocin started   EDC 25   contractions every 4-5 monutes   FHr cat 2   pelvic exam cx is 6cms 80% Vertex -2    clear fluid   EFW 2900 grams  expect
